# Patient Record
Sex: FEMALE | Race: WHITE | NOT HISPANIC OR LATINO | Employment: FULL TIME
[De-identification: names, ages, dates, MRNs, and addresses within clinical notes are randomized per-mention and may not be internally consistent; named-entity substitution may affect disease eponyms.]

---

## 2017-06-03 ENCOUNTER — HEALTH MAINTENANCE LETTER (OUTPATIENT)
Age: 52
End: 2017-06-03

## 2020-02-17 ENCOUNTER — TRANSFERRED RECORDS (OUTPATIENT)
Dept: HEALTH INFORMATION MANAGEMENT | Facility: CLINIC | Age: 55
End: 2020-02-17

## 2020-02-17 LAB
ALT SERPL-CCNC: 31 IU/L (ref 8–45)
AST SERPL-CCNC: 36 IU/L (ref 2–40)
CHOLEST SERPL-MCNC: 176 MG/DL (ref 100–199)
CREAT SERPL-MCNC: 0.8 MG/DL (ref 0.57–1.11)
GFR SERPL CREATININE-BSD FRML MDRD: >60 ML/MIN/1.73M2
GLUCOSE SERPL-MCNC: 86 MG/DL (ref 65–100)
HDLC SERPL-MCNC: 54 MG/DL
LDLC SERPL CALC-MCNC: 106 MG/DL
NONHDLC SERPL-MCNC: 122 MG/DL
PAP-ABSTRACT: NORMAL
POTASSIUM SERPL-SCNC: 4.7 MMOL/L (ref 3.5–5)
TRIGL SERPL-MCNC: 78 MG/DL

## 2021-02-22 ENCOUNTER — OFFICE VISIT (OUTPATIENT)
Dept: FAMILY MEDICINE | Facility: CLINIC | Age: 56
End: 2021-02-22
Payer: COMMERCIAL

## 2021-02-22 VITALS
HEART RATE: 81 BPM | OXYGEN SATURATION: 98 % | TEMPERATURE: 97.1 F | BODY MASS INDEX: 22.53 KG/M2 | SYSTOLIC BLOOD PRESSURE: 98 MMHG | HEIGHT: 64 IN | DIASTOLIC BLOOD PRESSURE: 68 MMHG | WEIGHT: 132 LBS

## 2021-02-22 DIAGNOSIS — Z00.00 ROUTINE HISTORY AND PHYSICAL EXAMINATION OF ADULT: Primary | ICD-10-CM

## 2021-02-22 DIAGNOSIS — Z12.31 BREAST CANCER SCREENING BY MAMMOGRAM: ICD-10-CM

## 2021-02-22 DIAGNOSIS — M25.512 LEFT SHOULDER PAIN, UNSPECIFIED CHRONICITY: ICD-10-CM

## 2021-02-22 DIAGNOSIS — E78.5 HYPERLIPIDEMIA LDL GOAL <130: ICD-10-CM

## 2021-02-22 DIAGNOSIS — G43.009 MIGRAINE WITHOUT AURA AND WITHOUT STATUS MIGRAINOSUS, NOT INTRACTABLE: ICD-10-CM

## 2021-02-22 LAB
ALBUMIN SERPL-MCNC: 4 G/DL (ref 3.4–5)
ALP SERPL-CCNC: 98 U/L (ref 40–150)
ALT SERPL W P-5'-P-CCNC: 27 U/L (ref 0–50)
ANION GAP SERPL CALCULATED.3IONS-SCNC: 8 MMOL/L (ref 3–14)
AST SERPL W P-5'-P-CCNC: 27 U/L (ref 0–45)
BILIRUB SERPL-MCNC: 0.6 MG/DL (ref 0.2–1.3)
BUN SERPL-MCNC: 16 MG/DL (ref 7–30)
CALCIUM SERPL-MCNC: 9.6 MG/DL (ref 8.5–10.1)
CHLORIDE SERPL-SCNC: 108 MMOL/L (ref 94–109)
CHOLEST SERPL-MCNC: 181 MG/DL
CO2 SERPL-SCNC: 25 MMOL/L (ref 20–32)
CREAT SERPL-MCNC: 0.84 MG/DL (ref 0.52–1.04)
GFR SERPL CREATININE-BSD FRML MDRD: 77 ML/MIN/{1.73_M2}
GLUCOSE SERPL-MCNC: 77 MG/DL (ref 70–99)
HDLC SERPL-MCNC: 62 MG/DL
LDLC SERPL CALC-MCNC: 108 MG/DL
NONHDLC SERPL-MCNC: 119 MG/DL
POTASSIUM SERPL-SCNC: 3.8 MMOL/L (ref 3.4–5.3)
PROT SERPL-MCNC: 7.3 G/DL (ref 6.8–8.8)
SODIUM SERPL-SCNC: 141 MMOL/L (ref 133–144)
TRIGL SERPL-MCNC: 57 MG/DL

## 2021-02-22 PROCEDURE — 80053 COMPREHEN METABOLIC PANEL: CPT | Performed by: FAMILY MEDICINE

## 2021-02-22 PROCEDURE — 99386 PREV VISIT NEW AGE 40-64: CPT | Performed by: FAMILY MEDICINE

## 2021-02-22 PROCEDURE — 36415 COLL VENOUS BLD VENIPUNCTURE: CPT | Performed by: FAMILY MEDICINE

## 2021-02-22 PROCEDURE — 86803 HEPATITIS C AB TEST: CPT | Performed by: FAMILY MEDICINE

## 2021-02-22 PROCEDURE — 87389 HIV-1 AG W/HIV-1&-2 AB AG IA: CPT | Performed by: FAMILY MEDICINE

## 2021-02-22 PROCEDURE — 80061 LIPID PANEL: CPT | Performed by: FAMILY MEDICINE

## 2021-02-22 PROCEDURE — 99214 OFFICE O/P EST MOD 30 MIN: CPT | Mod: 25 | Performed by: FAMILY MEDICINE

## 2021-02-22 RX ORDER — TRIAMCINOLONE ACETONIDE 0.25 MG/G
OINTMENT TOPICAL 2 TIMES DAILY
COMMUNITY
End: 2022-02-28

## 2021-02-22 RX ORDER — RIZATRIPTAN BENZOATE 10 MG/1
10 TABLET ORAL
COMMUNITY
Start: 2020-02-17 | End: 2021-02-22

## 2021-02-22 RX ORDER — ATORVASTATIN CALCIUM 10 MG/1
10 TABLET, FILM COATED ORAL
COMMUNITY
Start: 2020-03-20 | End: 2021-02-22

## 2021-02-22 RX ORDER — ATORVASTATIN CALCIUM 10 MG/1
10 TABLET, FILM COATED ORAL DAILY
Qty: 90 TABLET | Refills: 3 | Status: SHIPPED | OUTPATIENT
Start: 2021-02-22 | End: 2022-04-04

## 2021-02-22 RX ORDER — RIZATRIPTAN BENZOATE 10 MG/1
10 TABLET ORAL
Qty: 9 TABLET | Refills: 1 | Status: SHIPPED | OUTPATIENT
Start: 2021-02-22 | End: 2022-01-21

## 2021-02-22 ASSESSMENT — MIFFLIN-ST. JEOR: SCORE: 1168.75

## 2021-02-22 NOTE — PATIENT INSTRUCTIONS
Patient Education     Prevention Guidelines, Women Ages 50 to 64  Screening tests and vaccines are an important part of managing your health. A screening test is done to find possible disorders or diseases in people who don't have any symptoms. The goal is to find a disease early so lifestyle changes can be made and you can be watched more closely to reduce the risk of disease, or to detect it early enough to treat it most effectively. Screening tests are not considered diagnostic, but are used to determine if more testing is needed. Health counseling is essential, too. Below are guidelines for these, for women ages 50 to 64. Keep in mind that screening advice varies among expert groups. Talk with your healthcare provider about which tests are best for you and to make sure you re up to date on what you need.  Screening Who needs it How often   Type 2 diabetes or prediabetes All women beginning at age 45 and women without symptoms at any age who are overweight or obese and have 1 or more additional risk factors for diabetes. At  least every 3 years   Type 2 diabetes or prediabetes All women diagnosed with gestational diabetes Lifelong testing at least every 3 years   Type 2 diabetes All women with prediabetes Every year   Unhealthy alcohol use All women in this age group At routine exams   Blood pressure All women in this age group Yearly checkup if your blood pressure is normal  Normal blood pressure is less than 120/80 mm Hg  If your blood pressure reading is higher than normal, follow the advice of your healthcare provider   Breast cancer All women at average risk in this age group Yearly mammogram should be done until age 54. At age 55, you can switch to every other year or choose to continue yearly.  All women should know how their breasts normally look and feel and know the possible benefits and risks of breast cancer screening with mammograms.   Cervical cancer All women in this age group, except women who  have had a complete hysterectomy Pap test every 3 years or Pap test with human papillomavirus (HPV) test every 5 years   Chlamydia Women who are sexually active and at increased risk for infection At yearly routine exams   Colorectal cancer All women at average risk in this age group Multiple tests are available and are used at different times. Possible tests include:    Flexible sigmoidoscopy every 5 years, or    Colonoscopy every 10 years, or    CT colonography (virtual colonoscopy) every 5 years, or    Yearly fecal occult blood test, or    Yearly fecal immunochemical test every year, or    Stool DNA test, every 3 years  If you choose a test other than a colonoscopy and have an abnormal test result, you will need to follow up with a colonoscopy. Screening advice varies among expert groups. Talk with your healthcare provider about which tests are best for you.  Some people should be screened using a different schedule because of their personal or family health history. Talk with your healthcare provider about your health history.   Depression All women in this age group At routine exams   Gonorrhea Sexually active women at increased risk for infection At yearly routine exams   Hepatitis C Anyone at increased risk; 1 time for those born between 1945 and 1965 At routine exams   High cholesterol or triglycerides All women in this age group who are at risk for coronary artery disease At least every 5 years; talk with your healthcare provider about your risk   HIV All women At least once during your lifetime; yearly if at high risk   Lung cancer Women between the ages of 55 to 74 who are in fairly good health and are at higher risk for lung cancer       Currently smoke or have  quit within past 15 years       30-pack-year smoking history  , Eligibility criteria and age limit (possibly up to age 80) may vary across major organizations Yearly lung cancer screening with a low-dose CT scan (LDCT) Talk with your healthcare  provider for more information.   Obesity All women in this age group At yearly routine exams   Osteoporosis Women who are postmenopausal Talk with your healthcare provider   Syphilis Women at increased risk for infection At routine exams; talk with your healthcare provider   Tuberculosis Women at increased risk for infection Talk with your healthcare provider   Vision All women in this age group Talk with your healthcare provider   Vaccine Who needs it How often   Chickenpox (varicella) All women in this age group who have no record of this infection or vaccine 2 doses; the second dose should be given at least 4 weeks after the first dose   Hepatitis A Women at increased risk for infection 2 or 3 doses (depending on the vaccine) given at least 6 months apart; talk with your healthcare provider   Hepatitis B Women at increased risk for infection 2 or 3 doses (depending on the vaccine) ; second dose should be given 1 month after the first dose; if a third dose, it should be given at least 2 months after the second dose and at least 4 months after the first dose; talk with your healthcare provider   Haemophilus influenzae Type B (HIB) Women at increased risk for infection 1 or 3 doses; talk with your healthcare provider   Influenza (flu) All women in this age group Once a year   Measles, mumps, rubella (MMR) Women in this age group born in 1957 or later who have no record of these infections or vaccines 1 or 2doses   Meningococcal Women at increased risk for infection 1 or more doses; talk with your healthcare provider   Pneumococcal conjugate vaccine (PCV13) and pneumococcal polysaccharide vaccine (PPSV23) Women at increased risk for infection PCV13: 1 dose ages 19 to 64 (protects against 13 types of pneumococcal bacteria)  PPSV23: 1 or 2 doses through age 64(protects against 23 types of pneumococcal bacteria)  Talk with your healthcare provider   Tetanus/diphtheria/pertussis (Td/Tdap) booster All women in this age  group Td every 10 years, or a 1-time dose of Tdap instead of a Td booster after age 18, then Td every 10 years   Zoster (Shingles) All women ages 50 and older 2 vaccines are available:       Recombinant zoster vaccine (RZV; Shingrix) is recommended as the preferred shingles vaccine. It's given in a series of 2 doses The 2nd dose is given 2 to 6 months after the first. This is given even if you've had shingles before or or had a previous Zoster live vaccine (ZVL; Zostavax).    Zoster live vaccine (ZVL; Zostavax) may be given to healthy adults over age 60. It's given in one dose      Counseling Who needs it How often   BRCA gene mutation testing for breast and ovarian cancer susceptibility Women with increased risk for having gene mutation When your risk is known; talk with your healthcare provider   Breast cancer and chemoprevention Women at high risk for breast cancer When your risk is known; talk with your healthcare provider   Diet and exercise Women who are overweight or obese When diagnosed, and then at routine exams   Sexually transmitted infection prevention Women at increased risk for infection At routine exams; talk with your healthcare provider   Use of daily aspirin Women ages 50 and up who are at high risk for cardiovascular health problems and not at increased risk for bleeding as identified by their healthcare provider When your risk is known; talk with your healthcare provider   Use of tobacco and the health effects it can cause All women in this age group Every exam   Yamile last reviewed this educational content on 6/1/2020 2000-2020 [employer]. [address] [place] [address], [place] [address]. All rights reserved. This information is not intended as a substitute for professional medical care. Always follow your healthcare professional's instructions.

## 2021-02-22 NOTE — PROGRESS NOTES
SUBJECTIVE:   CC: Ana Cristina Ruth is an 55 year old woman who presents for preventive health visit.         Healthy Habits:    Getting at least 3 servings of Calcium per day:  Yes    Bi-annual eye exam:  Yes    Dental care twice a year:  Yes    Sleep apnea or symptoms of sleep apnea:  None    Diet:  Regular (no restrictions)    Frequency of exercise:  6-7 days/week    Duration of exercise:  30-45 minutes    Taking medications regularly:  Yes    Barriers to taking medications:  None    Medication side effects:  None    PHQ-2 Total Score:    Additional concerns today:  Yes      PROBLEMS TO ADD ON...  Hereto establish care, coming back to [facility] after so many  years   Due for fasting labs and also need refill   Musculoskeletal problem/pain      Duration: left on going since last year     Description   Location: left shoulder front mostly but shoulder always feels tight , no radiating pain, numbness  down the arm.  no neck pain. No recall of injury.  She thought it may have started after she was swimming a lot last year but not any more . She also  does lot of computer work. She had PT over a year ago and it felt a bit better.  she has had may be minor problem on and off since but last couple of months it has started  bothering her more. Has been shoveling snow, does yoga etc and it can aggravate pain. Not taking pain med's as much but concerned with ongoing issue    Intensity:  mild, moderate    Accompanying signs and symptoms:  Denies any  radiation of pain to , numbness, tingling, warmth and swelling    History  Previous similar problem: YES last year , but was better after PT   Previous evaluation:  Had PT     Precipitating or alleviating factors:  Trauma or overuse: no trauma but over use YES, as above   Aggravating factors include: ROM aggravates pain    Therapies tried and outcome: mostly  nothing. Occasional OTC pain med's as needed     Hyperlipidemia Follow-Up      Are you regularly taking any  medication or supplement to lower your cholesterol?   Yes- lipitor     Are you having muscle aches or other side effects that you think could be caused by your cholesterol lowering medication?  No    Migraine     Since your last clinic visit, how have your headaches changed?  No change    How often are you getting headaches or migraines? May be once month , not as frequent now      Are you able to do normal daily activities when you have a migraine? Yes    Are you taking rescue/relief medications? (Select all that apply) Excedrin sometimes takes care of it at early , and if it bad  Maxalt mostly works     How helpful is your rescue/relief medication?  I get total relief    Are you taking any medications to prevent migraines? (Select all that apply)  No    In the past 4 weeks, how often have you gone to urgent care or the emergency room because of your headaches?  0      Today's PHQ-2 Score:   PHQ-2 ( 1999 Pfizer) 2/22/2021   Q1: Little interest or pleasure in doing things 0   Q2: Feeling down, depressed or hopeless 0   PHQ-2 Score 0       Abuse: Current or Past (Physical, Sexual or Emotional) - No  Do you feel safe in your environment? Yes    Have you ever done Advance Care Planning? (For example, a Health Directive, POLST, or a discussion with a medical provider or your loved ones about your wishes): Yes, advance care planning is on file.    Social History     Tobacco Use     Smoking status: Never Smoker   Substance Use Topics     Alcohol use: Yes     Comment: occasionally         No flowsheet data found.    Any new diagnosis of family breast, ovarian, or bowel cancer?no     Reviewed orders with patient.  Reviewed health maintenance and updated orders accordingly - Yes  Patient Active Problem List   Diagnosis     Other type of intractable migraine     Past Surgical History:   Procedure Laterality Date     C/SECTION, LOW TRANSVERSE  1999     COLONOSCOPY  11/8/2013    Procedure: COLONOSCOPY;  COLONOSCOPY ;  Surgeon:  Geeta Stevens MD;  Location:  GI       Social History     Tobacco Use     Smoking status: Never Smoker     Smokeless tobacco: Never Used   Substance Use Topics     Alcohol use: Yes     Comment: occasionally     Family History   Problem Relation Age of Onset     Thyroid Disease Father      Cerebrovascular Disease Father         TIA , at age 81      Lipids Mother      Diabetes No family hx of      Coronary Artery Disease No family hx of      Breast Cancer No family hx of      Colon Cancer No family hx of            Breast CA Risk Screening:  No flowsheet data found.      Mammogram Screening: Recommended mammography every 1-2 years with patient discussion and risk factor consideration  Pertinent mammograms are reviewed under the imaging tab.    History of abnormal Pap smear: NO - age 30- 65 PAP every 3 years recommended  PAP / HPV 6/8/2007 12/23/2005   PAP NIL NIL     Reviewed and updated as needed this visit by clinical staff                 Reviewed and updated as needed this visit by Provider                Past Medical History:   Diagnosis Date     Herpes simplex without mention of complication     treats with Zovirax cream     Irritable bowel syndrome     bloating     Other forms of migraine, with intractable migraine, so stated, without mention of status migrainosus     since age 18     Rash and other nonspecific skin eruption     anus        Review of Systems  CONSTITUTIONAL: NEGATIVE for fever, chills, change in weight  INTEGUMENTARU/SKIN: NEGATIVE for worrisome rashes, moles or lesions  EYES: NEGATIVE for vision changes or irritation  ENT: NEGATIVE for ear, mouth and throat problems  RESP: NEGATIVE for significant cough or SOB  BREAST: NEGATIVE for masses, tenderness or discharge  CV: NEGATIVE for chest pain, palpitations or peripheral edema  GI: NEGATIVE for nausea, abdominal pain, heartburn, or change in bowel habits  : NEGATIVE for unusual urinary or vaginal symptoms. Periods are  regular.  MUSCULOSKELETAL:NEGATIVE for significant arthralgias or myalgia and except as per HPI   NEURO: NEGATIVE for weakness, dizziness or paresthesias  ENDOCRINE: NEGATIVE for temperature intolerance, skin/hair changes  HEME/ALLERGY/IMMUNE: NEGATIVE for bleeding problems  PSYCHIATRIC: NEGATIVE for changes in mood or affect     OBJECTIVE:   There were no vitals taken for this visit.  Physical Exam  GENERAL: healthy, alert and no distress  EYES: Eyes grossly normal to inspection, PERRL and conjunctivae and sclerae normal  HENT: ear canals and TM's normal, nose and mouth without ulcers or lesions  NECK: no adenopathy, no asymmetry, masses, or scars and thyroid normal to palpation  RESP: lungs clear to auscultation - no rales, rhonchi or wheezes  BREAST: normal without masses, tenderness or nipple discharge and no palpable axillary masses or adenopathy  CV: regular rate and rhythm, normal S1 S2, no S3 or S4, no murmur, click or rub, no peripheral edema and peripheral pulses strong  ABDOMEN: soft, nontender, no hepatosplenomegaly, no masses and bowel sounds normal   (female): : deferred  RECTAL: deferred  MS: no gross musculoskeletal defects noted, no edema  SKIN: no suspicious lesions or rashes  NEURO: Normal strength and tone, mentation intact and speech normal  NEURO: Normal strength and tone, sensory exam grossly normal and mentation intact  PSYCH: mentation appears normal, affect normal/bright        ASSESSMENT/PLAN:   (Z00.00) Routine history and physical examination of adult  (primary encounter diagnosis)  Comment:   Plan: HIV Antigen Antibody Combo, Hepatitis C Screen         Reflex to HCV RNA Quant and Genotype            (Z12.31) Breast cancer screening by mammogram  Comment:   Plan: *MA Screening Digital Bilateral            (E78.5) Hyperlipidemia LDL goal <130  Comment:   Plan: Comprehensive metabolic panel, Lipid panel         reflex to direct LDL Fasting, atorvastatin         (LIPITOR) 10 MG tablet      "       (M26.512) Left shoulder pain, unspecified chronicity  Comment:   Plan: [facility] REFERRAL            (G43.009) Migraine without aura and without status migrainosus, not intractable  Comment:   Plan: rizatriptan (MAXALT) 10 MG tablet            COUNSELING:  Reviewed preventive health counseling, as reflected in patient instructions       Regular exercise       Healthy diet/nutrition       Vision screening       Hearing screening       Immunizations    Declined: Zoster due to Cost               Osteoporosis prevention/bone health       Colon cancer screening       Consider Hep C screening for all patients one time for ages 18-79 years       HIV screeninx in teen years, 1x in adult years, and at intervals if high risk    Estimated body mass index is 20.6 kg/m  as calculated from the following:    Height as of 13: 1.626 m (5' 4\").    Weight as of 13: 54.4 kg (120 lb).        She reports that she has never smoked. She does not have any smokeless tobacco history on file.      Counseling Resources:  ATP IV Guidelines  Pooled Cohorts Equation Calculator  Breast Cancer Risk Calculator  BRCA-Related Cancer Risk Assessment: FHS-7 Tool  FRAX Risk Assessment  ICSI Preventive Guidelines  Dietary Guidelines for Americans,   USDA's MyPlate  ASA Prophylaxis  Lung CA Screening    Saige Blum MD  [facility]  "

## 2021-02-23 LAB
HCV AB SERPL QL IA: NONREACTIVE
HIV 1+2 AB+HIV1 P24 AG SERPL QL IA: NONREACTIVE

## 2021-03-03 ENCOUNTER — THERAPY VISIT (OUTPATIENT)
Dept: PHYSICAL THERAPY | Facility: CLINIC | Age: 56
End: 2021-03-03
Attending: FAMILY MEDICINE
Payer: COMMERCIAL

## 2021-03-03 DIAGNOSIS — M25.512 LEFT SHOULDER PAIN, UNSPECIFIED CHRONICITY: ICD-10-CM

## 2021-03-03 PROCEDURE — 97110 THERAPEUTIC EXERCISES: CPT | Mod: GP | Performed by: PHYSICAL THERAPIST

## 2021-03-03 PROCEDURE — 97161 PT EVAL LOW COMPLEX 20 MIN: CPT | Mod: GP | Performed by: PHYSICAL THERAPIST

## 2021-03-03 NOTE — PROGRESS NOTES
"[facility] Initial Evaluation  Subjective:  The history is provided by the patient.   Patient Health History  Ana Cristinabelkys Ruth being seen for Shoulder pain.     Problem began: 1/3/2021.   Problem occurred: Unknown. Perhaps through exercise or housework.   Pain is reported as 3/10 (6/10 at worst) on pain scale.  General health as reported by patient is excellent.  Pertinent medical history includes: migraines/headaches.   Red flags:  None as reported by patient.  Medical allergies: none.   Surgeries include:  Other. Other surgery history details:  (21 years ago).    Current medications:  Anti-inflammatory and other. Other medications details: Ibuprofen for pain (occasionally), Atorvastatin for cholesterol, Maxalt for migraines, Retin-A for skin..    Current occupation is .   Primary job tasks include:  Computer work.                  Therapist Generated HPI Evaluation         Type of problem:  Left shoulder.    This is a recurrent condition.  Condition occurred with:  Unknown cause.  Where condition occurred: at home and during recreation/sport.  Patient reports pain:  Anterior.  Pain is described as aching and is intermittent.  Pain timing: more stiff in AM, worse post-exercise.  Since onset symptoms are unchanged.  Associated symptoms:  Tingling and painful arc (occasional tingling, \"something stretched out\"). Symptoms are exacerbated by using arm at shoulder level and using arm overhead  and relieved by heat.    Previous treatment includes physical therapy. There was significant improvement following previous treatment.  Restrictions due to condition include:  Working in normal job without restrictions.  Barriers include:  None as reported by patient.                        Objective:  Standing Alignment:      Shoulder/UE:  Rounded shoulders                                       Shoulder Evaluation:  ROM:  AROM:  normal                            PROM:  " normal                                Strength:  : pain w/ ABD, flex, IR,  Flexion: Left:5/5    Pain: +    Right: 5/5     Pain:     Abduction:  Left: 5/5   Pain:+    Right: 5/5     Pain:    Internal Rotation:  Left:5/5      Pain:+    Right: 5/5     Pain:  External Rotation:   Left:5/5      Pain:-   Right:5/5     Pain:            Stability Testing:  normal    Left shoulder stability negative testing:  Sulcus sign; Load and shift anterior and Load and shift posterior      Palpation:  Palpation assessed shoulder: tenderness over L biceps tendon.      Mobility Tests:            Scapulothoracic left:  Hypomobile  Scapulothoracic right:  Normal                                       General     ROS    Assessment/Plan:    Patient is a 55 year old female with left side shoulder complaints.    Patient has the following significant findings with corresponding treatment plan.                Diagnosis 1:  L shoulder pain with Scapular Dyskinesis  Pain -  hot/cold therapy, self management, education and home program  Decreased strength - therapeutic exercise and therapeutic activities  Decreased function - therapeutic activities  Impaired posture - neuro re-education, therapeutic activities and home program    Previous and current functional limitations:  (See Goal Flow Sheet for this information)    Short term and Long term goals: (See Goal Flow Sheet for this information)     Communication ability:  Patient appears to be able to clearly communicate and understand verbal and written communication and follow directions correctly.  Treatment Explanation - The following has been discussed with the patient:   RX ordered/plan of care  Anticipated outcomes  Possible risks and side effects  This patient would benefit from PT intervention to resume normal activities.   Rehab potential is excellent.    Frequency:  1 X week, once daily  Duration:  for 6 weeks  Discharge Plan:  Achieve all LTG.  Independent in home treatment  program.  Reach maximal therapeutic benefit.    Please refer to the daily flowsheet for treatment today, total treatment time and time spent performing 1:1 timed codes.

## 2021-03-09 DIAGNOSIS — Z12.31 BREAST CANCER SCREENING BY MAMMOGRAM: ICD-10-CM

## 2021-03-09 PROCEDURE — 77067 SCR MAMMO BI INCL CAD: CPT | Mod: TC | Performed by: RADIOLOGY

## 2021-03-09 PROCEDURE — 77063 BREAST TOMOSYNTHESIS BI: CPT | Mod: TC | Performed by: RADIOLOGY

## 2021-03-10 ENCOUNTER — THERAPY VISIT (OUTPATIENT)
Dept: PHYSICAL THERAPY | Facility: CLINIC | Age: 56
End: 2021-03-10
Payer: COMMERCIAL

## 2021-03-10 DIAGNOSIS — M25.512 LEFT SHOULDER PAIN, UNSPECIFIED CHRONICITY: ICD-10-CM

## 2021-03-10 PROCEDURE — 97112 NEUROMUSCULAR REEDUCATION: CPT | Mod: GP | Performed by: PHYSICAL THERAPIST

## 2021-03-10 PROCEDURE — 97110 THERAPEUTIC EXERCISES: CPT | Mod: GP | Performed by: PHYSICAL THERAPIST

## 2021-03-24 ENCOUNTER — THERAPY VISIT (OUTPATIENT)
Dept: PHYSICAL THERAPY | Facility: CLINIC | Age: 56
End: 2021-03-24
Payer: COMMERCIAL

## 2021-03-24 DIAGNOSIS — M25.512 LEFT SHOULDER PAIN, UNSPECIFIED CHRONICITY: ICD-10-CM

## 2021-03-24 PROCEDURE — 97112 NEUROMUSCULAR REEDUCATION: CPT | Mod: GP | Performed by: PHYSICAL THERAPIST

## 2021-03-24 PROCEDURE — 97110 THERAPEUTIC EXERCISES: CPT | Mod: GP | Performed by: PHYSICAL THERAPIST

## 2021-03-25 ENCOUNTER — IMMUNIZATION (OUTPATIENT)
Dept: NURSING | Facility: CLINIC | Age: 56
End: 2021-03-25
Payer: COMMERCIAL

## 2021-03-25 PROCEDURE — 91300 PR COVID VAC PFIZER DIL RECON 30 MCG/0.3 ML IM: CPT

## 2021-03-25 PROCEDURE — 0001A PR COVID VAC PFIZER DIL RECON 30 MCG/0.3 ML IM: CPT

## 2021-04-14 ENCOUNTER — THERAPY VISIT (OUTPATIENT)
Dept: PHYSICAL THERAPY | Facility: CLINIC | Age: 56
End: 2021-04-14
Payer: COMMERCIAL

## 2021-04-14 DIAGNOSIS — M25.512 LEFT SHOULDER PAIN, UNSPECIFIED CHRONICITY: ICD-10-CM

## 2021-04-14 PROCEDURE — 97110 THERAPEUTIC EXERCISES: CPT | Mod: GP | Performed by: PHYSICAL THERAPIST

## 2021-04-14 PROCEDURE — 97112 NEUROMUSCULAR REEDUCATION: CPT | Mod: GP | Performed by: PHYSICAL THERAPIST

## 2021-04-14 NOTE — PROGRESS NOTES
Subjective:  HPI  Physical Exam                    Objective:  System    Physical Exam    General     ROS    Assessment/Plan:    DISCHARGE REPORT    Progress reporting period is from initial to 4/14/2021.       SUBJECTIVE  Subjective changes noted by patient:  Ana Cristina returns to PT feeling little better overall, HEP no issues. She does have some L shoulder ache with moving arm out to the side and she is fearful of how her arm would feel with starting to swim.  She swam more last year but thinking this caused some of her L shoulder sx's.  Overall having L anterior shoulder ache with certain movements, this is less but not 100% better.  Current pain level is 2/10.    Previous pain level was  NA Initial Pain level: 6/10.   Changes in function:  Yes (See Goal flowsheet attached for changes in current functional level)  Adverse reaction to treatment or activity: None    OBJECTIVE  Changes noted in objective findings:  Yes, shoulder AROM WNL, strength progressing but continued limits with ER and Horz Abd.  Shoulder AROM Flex WNL, ABD WNL w pain L, ER WNL..  PROM WNL ER 90 deg, IR 75 no pain.  MMT ER 5-/5, IR 5/5, Flex/Scap 4+/5.  Pain with Obriens (less pain step 2 so  + for biceps LHB involvement)     ASSESSMENT/PLAN  Updated problem list and treatment plan: Diagnosis 1:  L shoulder pain, LHB pathology probable  Pain -  hot/cold therapy, education and home program  Decreased strength - therapeutic exercise and therapeutic activities  Decreased function - therapeutic activities  Impaired posture - neuro re-education  STG/LTGs have been met or progress has been made towards goals:  Yes (See Goal flow sheet completed today.)  Assessment of Progress: The patient's condition is improving.  Self Management Plans:  Patient has been instructed in a home treatment program.  Patient is independent in a home treatment program.  Patient  has been instructed in self management of symptoms.  Patient is independent in self management of  symptoms.  I have re-evaluated this patient and find that the nature, scope, duration and intensity of the therapy is appropriate for the medical condition of the patient.  Ana Cristina continues to require the following intervention to meet STG and LTG's:  PT intervention is no longer required to meet STG/LTG.    Recommendations:  This patient is ready to be discharged from therapy and continue their home treatment program.  Pt will reach out to PT with any concerns/questions over the next 6 weeks.  Gave Dr Aragon name as possible shoulder Ortho MD for her to see in future if she needs.     Please refer to the daily flowsheet for treatment today, total treatment time and time spent performing 1:1 timed codes.

## 2021-04-15 ENCOUNTER — IMMUNIZATION (OUTPATIENT)
Dept: NURSING | Facility: CLINIC | Age: 56
End: 2021-04-15
Attending: INTERNAL MEDICINE
Payer: COMMERCIAL

## 2021-04-15 PROCEDURE — 91300 PR COVID VAC PFIZER DIL RECON 30 MCG/0.3 ML IM: CPT

## 2021-04-15 PROCEDURE — 0002A PR COVID VAC PFIZER DIL RECON 30 MCG/0.3 ML IM: CPT

## 2021-10-24 ENCOUNTER — HEALTH MAINTENANCE LETTER (OUTPATIENT)
Age: 56
End: 2021-10-24

## 2022-01-21 DIAGNOSIS — G43.009 MIGRAINE WITHOUT AURA AND WITHOUT STATUS MIGRAINOSUS, NOT INTRACTABLE: ICD-10-CM

## 2022-01-21 RX ORDER — RIZATRIPTAN BENZOATE 10 MG/1
TABLET ORAL
Qty: 9 TABLET | Refills: 0 | Status: SHIPPED | OUTPATIENT
Start: 2022-01-21 | End: 2022-04-04

## 2022-01-21 NOTE — TELEPHONE ENCOUNTER
Prescription approved per [facility] Refill Protocol.    Rizwana WADE RN  EP Triage

## 2022-02-28 ENCOUNTER — OFFICE VISIT (OUTPATIENT)
Dept: FAMILY MEDICINE | Facility: CLINIC | Age: 57
End: 2022-02-28
Payer: COMMERCIAL

## 2022-02-28 VITALS
RESPIRATION RATE: 12 BRPM | HEIGHT: 64 IN | OXYGEN SATURATION: 98 % | SYSTOLIC BLOOD PRESSURE: 108 MMHG | TEMPERATURE: 97.8 F | HEART RATE: 69 BPM | WEIGHT: 137 LBS | DIASTOLIC BLOOD PRESSURE: 62 MMHG | BODY MASS INDEX: 23.39 KG/M2

## 2022-02-28 DIAGNOSIS — M79.18 GLUTEAL PAIN: Primary | ICD-10-CM

## 2022-02-28 DIAGNOSIS — K59.09 OTHER CONSTIPATION: ICD-10-CM

## 2022-02-28 PROCEDURE — 99214 OFFICE O/P EST MOD 30 MIN: CPT | Performed by: FAMILY MEDICINE

## 2022-02-28 RX ORDER — VITAMIN B COMPLEX
TABLET ORAL DAILY
COMMUNITY

## 2022-02-28 RX ORDER — POLYETHYLENE GLYCOL 3350 17 G/17G
1 POWDER, FOR SOLUTION ORAL DAILY
Qty: 507 G | Refills: 3 | Status: SHIPPED | OUTPATIENT
Start: 2022-02-28 | End: 2023-05-10

## 2022-02-28 RX ORDER — NAPROXEN 500 MG/1
500 TABLET ORAL 2 TIMES DAILY WITH MEALS
Qty: 20 TABLET | Refills: 0 | Status: SHIPPED | OUTPATIENT
Start: 2022-02-28 | End: 2022-05-05

## 2022-02-28 NOTE — PROGRESS NOTES
Assessment & Plan     (M79.18) Gluteal pain  (primary encounter diagnosis)  Comment: left / back of thigh mostly -sounds like gluteal strain , doubt sciatica   Plan: naproxen (NAPROSYN) 500 MG tablet             discussed  cares and symptomatic treatment including  adequate pain control, heat,  stretches etc. Script faxed         she will do follow up if no improvement or problem. Consider further evaluation  if needed.       (K59.09) Other constipation  Comment:   Plan: polyethylene glycol (MIRALAX) 17 GM/Dose powder     talked about High fibre / fluid diet helps to regulate  bowl movement .  I have sent a prescription of miralax  to help with constipation to use as needed. Also talked about probiotic/ squatty potty , regular walking etc to help   .  Cares and  symptomatic treatment discussed.  make sure to do  follow up if  recurrent or ongoing problem. We should  consider further evaluation if needed.     Check labs. refill sent.Cares and  treatment discussed follow. up if problem   Patient expressed understanding and agreement with treatment plan. All patient's questions were answered, will let me know if has more later.  Medications: Rx's: Reviewed the potential side effects/complications of medications prescribed.             Saige Blum MD  [facility] De La Paz is a 56 year old who presents for the following health issues     Musculoskeletal Problem    History of Present Illness     Reason for visit:  Pain in bottom that radiates down legs, and questions about excessive gas  Symptom onset:  More than a month  Symptoms include:  Pain that starts in leg and radiates down legs, and excessive intestinal gas  Symptom intensity:  Moderate  Symptom progression:  Staying the same  What makes it worse:  For legs, sitting for extended periods, walking distances, running outside. For gas, varies  What makes it better:  Legs: Opposite of things that make it feel worse.  "Gas: Not eating certain foods    She eats 2-3 servings of fruits and vegetables daily.She consumes 0 sweetened beverage(s) daily.She exercises with enough effort to increase her heart rate 30 to 60 minutes per day.  She exercises with enough effort to increase her heart rate 7 days per week.   She is taking medications regularly.        Where is your back pain located? (Select all that apply) gluteus left    How would you describe your back pain?  burning and gnawing    Where does your back pain spread?  Mostly localized to  the left buttock/ back of thigh, no radiation further down the leg     Since your last clinic visit for back pain, how has your pain changed? gradually worsening    Does your back pain interfere with your job? 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                          No other associated urinary/ GI sx except often has on and off constipation/ gas feeling,  but no new and no  recent  change , although it does become annoying. Tries to take metamucil to help       have you  any previous problem.     any  treatment? No        Review of Systems   Constitutional, HEENT, cardiovascular, pulmonary, GI, , musculoskeletal, neuro, skin, endocrine and psych systems are negative, except as otherwise noted.      Objective    /62   Pulse 69   Temp 97.8  F (36.6  C) (Tympanic)   Resp 12   Ht 1.626 m (5' 4\")   Wt 62.1 kg (137 lb)   SpO2 98%   BMI 23.52 kg/m    Body mass index is 23.52 kg/m .  Physical Exam   GENERAL: healthy, alert and no distress  NECK: no adenopathy, no asymmetry, masses, or scars and thyroid normal to palpation  RESP: lungs clear to auscultation - no rales, rhonchi or wheezes  CV: regular rate and rhythm, normal S1 S2, no S3 or S4, no murmur,  ABDOMEN: soft, nontender, no " hepatosplenomegaly, no masses and bowel sounds normal  MS: minimal tenderness left gluteal area mostly and hip ROM is ok but aggravates pain mostly on left . No back / spinal  tenderness to palpation      Back ROM is good  NEURO: Normal strength and tone, mentation intact and speech normal  PSYCH: mentation appears normal, affect normal/bright

## 2022-03-28 ASSESSMENT — ENCOUNTER SYMPTOMS
NAUSEA: 0
JOINT SWELLING: 0
DIARRHEA: 0
PALPITATIONS: 0
CHILLS: 0
FEVER: 0
SORE THROAT: 0
SHORTNESS OF BREATH: 0
ARTHRALGIAS: 0
FREQUENCY: 0
DYSURIA: 0
MYALGIAS: 1
HEADACHES: 1
CONSTIPATION: 1
COUGH: 0
EYE PAIN: 0
ABDOMINAL PAIN: 0
HEMATURIA: 0
NERVOUS/ANXIOUS: 0
BREAST MASS: 0
HEMATOCHEZIA: 0
PARESTHESIAS: 0
WEAKNESS: 0
DIZZINESS: 0
HEARTBURN: 0

## 2022-04-04 ENCOUNTER — OFFICE VISIT (OUTPATIENT)
Dept: FAMILY MEDICINE | Facility: CLINIC | Age: 57
End: 2022-04-04
Payer: COMMERCIAL

## 2022-04-04 VITALS
DIASTOLIC BLOOD PRESSURE: 80 MMHG | HEIGHT: 64 IN | BODY MASS INDEX: 22.71 KG/M2 | TEMPERATURE: 97.6 F | OXYGEN SATURATION: 100 % | WEIGHT: 133 LBS | RESPIRATION RATE: 16 BRPM | SYSTOLIC BLOOD PRESSURE: 120 MMHG | HEART RATE: 55 BPM

## 2022-04-04 DIAGNOSIS — Z12.31 BREAST CANCER SCREENING BY MAMMOGRAM: ICD-10-CM

## 2022-04-04 DIAGNOSIS — E78.5 HYPERLIPIDEMIA LDL GOAL <130: ICD-10-CM

## 2022-04-04 DIAGNOSIS — K59.09 OTHER CONSTIPATION: ICD-10-CM

## 2022-04-04 DIAGNOSIS — G43.009 MIGRAINE WITHOUT AURA AND WITHOUT STATUS MIGRAINOSUS, NOT INTRACTABLE: ICD-10-CM

## 2022-04-04 DIAGNOSIS — Z83.49 FAMILY HISTORY OF THYROID DISEASE: ICD-10-CM

## 2022-04-04 DIAGNOSIS — Z00.00 ROUTINE GENERAL MEDICAL EXAMINATION AT A HEALTH CARE FACILITY: Primary | ICD-10-CM

## 2022-04-04 PROBLEM — Z86.69: Status: ACTIVE | Noted: 2022-04-04

## 2022-04-04 LAB
ALBUMIN SERPL-MCNC: 3.9 G/DL (ref 3.4–5)
ALP SERPL-CCNC: 92 U/L (ref 40–150)
ALT SERPL W P-5'-P-CCNC: 31 U/L (ref 0–50)
ANION GAP SERPL CALCULATED.3IONS-SCNC: 6 MMOL/L (ref 3–14)
AST SERPL W P-5'-P-CCNC: 27 U/L (ref 0–45)
BILIRUB SERPL-MCNC: 0.6 MG/DL (ref 0.2–1.3)
BUN SERPL-MCNC: 13 MG/DL (ref 7–30)
CALCIUM SERPL-MCNC: 9.5 MG/DL (ref 8.5–10.1)
CHLORIDE BLD-SCNC: 105 MMOL/L (ref 94–109)
CHOLEST SERPL-MCNC: 171 MG/DL
CO2 SERPL-SCNC: 27 MMOL/L (ref 20–32)
CREAT SERPL-MCNC: 0.86 MG/DL (ref 0.52–1.04)
FASTING STATUS PATIENT QL REPORTED: YES
GFR SERPL CREATININE-BSD FRML MDRD: 78 ML/MIN/1.73M2
GLUCOSE BLD-MCNC: 82 MG/DL (ref 70–99)
HDLC SERPL-MCNC: 54 MG/DL
LDLC SERPL CALC-MCNC: 95 MG/DL
NONHDLC SERPL-MCNC: 117 MG/DL
POTASSIUM BLD-SCNC: 4.1 MMOL/L (ref 3.4–5.3)
PROT SERPL-MCNC: 7.4 G/DL (ref 6.8–8.8)
SODIUM SERPL-SCNC: 138 MMOL/L (ref 133–144)
TRIGL SERPL-MCNC: 110 MG/DL
TSH SERPL DL<=0.005 MIU/L-ACNC: 1.7 MU/L (ref 0.4–4)

## 2022-04-04 PROCEDURE — 80061 LIPID PANEL: CPT | Performed by: FAMILY MEDICINE

## 2022-04-04 PROCEDURE — 36415 COLL VENOUS BLD VENIPUNCTURE: CPT | Performed by: FAMILY MEDICINE

## 2022-04-04 PROCEDURE — 80053 COMPREHEN METABOLIC PANEL: CPT | Performed by: FAMILY MEDICINE

## 2022-04-04 PROCEDURE — 99213 OFFICE O/P EST LOW 20 MIN: CPT | Mod: 25 | Performed by: FAMILY MEDICINE

## 2022-04-04 PROCEDURE — 84443 ASSAY THYROID STIM HORMONE: CPT | Performed by: FAMILY MEDICINE

## 2022-04-04 PROCEDURE — 99396 PREV VISIT EST AGE 40-64: CPT | Performed by: FAMILY MEDICINE

## 2022-04-04 RX ORDER — RIZATRIPTAN BENZOATE 10 MG/1
10 TABLET ORAL
Qty: 9 TABLET | Refills: 1 | Status: SHIPPED | OUTPATIENT
Start: 2022-04-04 | End: 2023-05-10

## 2022-04-04 RX ORDER — ATORVASTATIN CALCIUM 10 MG/1
10 TABLET, FILM COATED ORAL DAILY
Qty: 90 TABLET | Refills: 3 | Status: SHIPPED | OUTPATIENT
Start: 2022-04-04 | End: 2023-05-10

## 2022-04-04 ASSESSMENT — ENCOUNTER SYMPTOMS
HEMATOCHEZIA: 0
FEVER: 0
WEAKNESS: 0
SORE THROAT: 0
HEARTBURN: 0
HEADACHES: 1
ARTHRALGIAS: 0
PALPITATIONS: 0
FREQUENCY: 0
CHILLS: 0
PARESTHESIAS: 0
JOINT SWELLING: 0
EYE PAIN: 0
COUGH: 0
CONSTIPATION: 1
SHORTNESS OF BREATH: 0
NERVOUS/ANXIOUS: 0
DIZZINESS: 0
DYSURIA: 0
BREAST MASS: 0
DIARRHEA: 0
NAUSEA: 0
MYALGIAS: 1
HEMATURIA: 0
ABDOMINAL PAIN: 0

## 2022-04-04 NOTE — PROGRESS NOTES
SUBJECTIVE:   CC: Ana Cristina Ruth is an 56 year old woman who presents for preventive health visit.       Patient has been advised of split billing requirements and indicates understanding: Yes  Healthy Habits:     Getting at least 3 servings of Calcium per day:  NO    Bi-annual eye exam:  Yes    Dental care twice a year:  Yes    Sleep apnea or symptoms of sleep apnea:  None    Diet:  Regular (no restrictions)    Frequency of exercise:  6-7 days/week    Duration of exercise:  45-60 minutes    Taking medications regularly:  Yes    Barriers to taking medications:  None    Medication side effects:  None    PHQ-2 Total Score: 0    Additional concerns today:  Yes          PROBLEMS TO ADD ON...  She has some ongoing issues with the constipation here to do follow-up.  She has started taking some MiraLAX that helped some but not enough.  Although recently she just started using a squatty potty and she thinks it has helped a bit.    Still a bit gassy and bloated feeling sometimes, although she is trying to make some changes in her diet and she thinks that she could be sensitive to dairy. willing to give a little bit more time.    She has noted a bit of a heat on tolerance, not sure if  could be just the menopause related.  Denies any fevers or chills or any unusual fatigue.  no significant weight loss or weight gain,  but she feel like she has gained some inches around her waist.  She does exercise quite regularly and does a good workout usually.  has family history of thyroid in that, willing to proceed with thyroid lab test today just to make sure it is okay.     Hyperlipidemia Follow-Up      Are you regularly taking any medication or supplement to lower your cholesterol?   Yes- see epic She is fasting for labs today and could use a refill on her medication.     Are you having muscle aches or other side effects that you think could be caused by your cholesterol lowering medication?  No    Migraine     Since your  last clinic visit, how have your headaches changed?  No change, doing quite well.  She sometimes gets attentive in her shoulder and neck, and that can sometimes work its way up to give her migraine.  Although no recent change and she thinks she is doing okay.  Could use refill on her Maxalt    How often are you getting headaches or migraines? Once a month      Are you able to do normal daily activities when you have a migraine? Yes    Are you taking rescue/relief medications? (Select all that apply) Maxalt    How helpful is your rescue/relief medication?  I get total relief    Are you taking any medications to prevent migraines? (Select all that apply)  No    In the past 4 weeks, how often have you gone to urgent care or the emergency room because of your headaches?  0      Today's PHQ-2 Score:   PHQ-2 ( 1999 Pfizer) 3/28/2022   Q1: Little interest or pleasure in doing things 0   Q2: Feeling down, depressed or hopeless 0   PHQ-2 Score 0   PHQ-2 Total Score (12-17 Years)- Positive if 3 or more points; Administer PHQ-A if positive -   Q1: Little interest or pleasure in doing things Not at all   Q2: Feeling down, depressed or hopeless Not at all   PHQ-2 Score 0       Abuse: Current or Past (Physical, Sexual or Emotional) - No  Do you feel safe in your environment? Yes        Social History     Tobacco Use     Smoking status: Never Smoker     Smokeless tobacco: Never Used   Substance Use Topics     Alcohol use: Yes     Comment: occasionally     If you drink alcohol do you typically have >3 drinks per day or >7 drinks per week? No    Alcohol Use 3/28/2022   Prescreen: >3 drinks/day or >7 drinks/week? No       Reviewed orders with patient.  Reviewed health maintenance and updated orders accordingly - Yes  Patient Active Problem List   Diagnosis     Other type of intractable migraine     Migraine without aura and without status migrainosus, not intractable     Hyperlipidemia LDL goal <130     Gluteal pain     Other  constipation     Past Surgical History:   Procedure Laterality Date     C/SECTION, LOW TRANSVERSE  1999     COLONOSCOPY  11/8/2013    Procedure: COLONOSCOPY;  COLONOSCOPY ;  Surgeon: Geeta Stevens MD;  Location:  GI       Social History     Tobacco Use     Smoking status: Never Smoker     Smokeless tobacco: Never Used   Substance Use Topics     Alcohol use: Yes     Comment: occasionally     Family History   Problem Relation Age of Onset     Thyroid Disease Father      Cerebrovascular Disease Father         TIA , at age 81      Lipids Mother      Diabetes No family hx of      Coronary Artery Disease No family hx of      Breast Cancer No family hx of      Colon Cancer No family hx of            Breast Cancer Screening:    FHS-7:   Breast CA Risk Assessment (FHS-7) 3/28/2022   Did any of your first-degree relatives have breast or ovarian cancer? No   Did any of your relatives have bilateral breast cancer? No   Did any man in your family have breast cancer? No   Did any woman in your family have breast and ovarian cancer? No   Did any woman in your family have breast cancer before age 50 y? No   Do you have 2 or more relatives with breast and/or ovarian cancer? Yes   Do you have 2 or more relatives with breast and/or bowel cancer? No       Mammogram Screening: Recommended mammography every 1-2 years with patient discussion and risk factor consideration  Pertinent mammograms are reviewed under the imaging tab.    History of abnormal Pap smear: NO - age 30- 65 PAP every 3 years recommended  PAP / HPV 6/8/2007 12/23/2005   PAP (Historical) NIL NIL     Reviewed and updated as needed this visit by clinical staff                  Reviewed and updated as needed this visit by Provider                 Past Medical History:   Diagnosis Date     Herpes simplex without mention of complication     treats with Zovirax cream     Irritable bowel syndrome     bloating     Other forms of migraine, with intractable migraine, so  stated, without mention of status migrainosus     since age 18     Rash and other nonspecific skin eruption     anus        Review of Systems   Constitutional: Negative for chills and fever.   HENT: Positive for hearing loss. Negative for congestion, ear pain and sore throat.    Eyes: Negative for pain and visual disturbance.   Respiratory: Negative for cough and shortness of breath.    Cardiovascular: Negative for chest pain, palpitations and peripheral edema.   Gastrointestinal: Positive for constipation. Negative for abdominal pain, diarrhea, heartburn, hematochezia and nausea.   Breasts:  Negative for tenderness, breast mass and discharge.   Genitourinary: Negative for dysuria, frequency, genital sores, hematuria, pelvic pain, urgency, vaginal bleeding and vaginal discharge.   Musculoskeletal: Positive for myalgias. Negative for arthralgias and joint swelling.   Skin: Negative for rash.   Neurological: Positive for headaches. Negative for dizziness, weakness and paresthesias.   Psychiatric/Behavioral: Negative for mood changes. The patient is not nervous/anxious.      CONSTITUTIONAL: NEGATIVE for fever, chills, change in weight  INTEGUMENTARY/SKIN: NEGATIVE for worrisome rashes, moles or lesions  EYES: NEGATIVE for vision changes or irritation  ENT: NEGATIVE for ear, mouth and throat problems.  Has history of labyrinthitis and has some hearing issues, few years ago and had seen the ENT.  She was told that she might of it mild decrease hearing, although it has remained stable denies any new concerns comfortable watching.  No new concerning symptoms otherwise  RESP: NEGATIVE for significant cough or SOB  BREAST: NEGATIVE for masses, tenderness or discharge  CV: NEGATIVE for chest pain, palpitations or peripheral edema  GI: NEGATIVE for nausea, abdominal pain, heartburn, or change in bowel habits  : NEGATIVE for unusual urinary or vaginal symptoms.  Postmenopausal . no vaginal bleeding.  MUSCULOSKELETAL: NEGATIVE  for significant arthralgias or myalgia  NEURO: NEGATIVE for weakness, dizziness or paresthesias, except as per HPI  ENDOCRINE: NEGATIVE for temperature intolerance, skin/hair changes  HEME/ALLERGY/IMMUNE: NEGATIVE for bleeding problems  PSYCHIATRIC: NEGATIVE for changes in mood or affect      OBJECTIVE:   There were no vitals taken for this visit.  Physical Exam  GENERAL: healthy, alert and no distress  EYES: Eyes grossly normal to inspection, PERRL and conjunctivae and sclerae normal  HENT: ear canals and TM's normal, nose and mouth without ulcers or lesions  NECK: no adenopathy, no asymmetry, masses, or scars and thyroid normal to palpation  RESP: lungs clear to auscultation - no rales, rhonchi or wheezes  BREAST: normal without masses, tenderness or nipple discharge and no palpable axillary masses or adenopathy  CV: regular rate and rhythm, normal S1 S2, no S3 or S4, no murmur, click or rub, no peripheral edema and peripheral pulses strong  ABDOMEN: soft, nontender, no hepatosplenomegaly, no masses and bowel sounds normal   (female): deferred  RECTAL: deferred  MS: no gross musculoskeletal defects noted, no edema  SKIN: no suspicious lesions or rashes  NEURO: Normal strength and tone, mentation intact and speech normal  PSYCH: mentation appears normal, affect normal/bright        ASSESSMENT/PLAN:   (Z00.00) Routine general medical examination at a health care facility  (primary encounter diagnosis)  Comment:   Plan:     (E78.5) Hyperlipidemia LDL goal <130  Comment:   Plan: Lipid panel reflex to direct LDL Fasting,         Comprehensive metabolic panel, atorvastatin         (LIPITOR) 10 MG tablet        Check labs.  Refill sent.  Follow-up as needed.     (G43.009) Migraine without aura and without status migrainosus, not intractable  Comment:   Plan: rizatriptan (MAXALT) 10 MG tablet             Treatment today as per [facility] orders. Willing to continue with Maxalt as it is working  good. Asked pt to keep  "diary of her migraine.  Follow up if  problem as  needed.       (K59.09) Other constipation  Comment:   Plan: TSH with free T4 reflex           Talked about high fibre / fluid diet to help regulate  bowl movement .  Continue with miralax  to help with constipation to use as needed.   Also talked about probiotic/ squatty potty , regular walking etc to help   Cares and  symptomatic treatment discussed.  Make sure to do  follow up if  recurrent or ongoing problem. We should  consider further evaluation if needed.       (Z12.31) Breast cancer screening by mammogram  Comment:   Plan: *MA Screening Digital Bilateral            (Z83.49) Family history of thyroid disease  Comment:   Plan: TSH with free T4 reflex        Discussed possible symptoms of  Thyroid.  Clinically she is doing okay.  Check labs.  Follow-up as needed            Check labs. refill sent.Cares and  treatment discussed.  follow up if problem   Patient expressed understanding and agreement with treatment plan. All patient's questions were answered, will let me know if has more later.  Medications: Rx's: Reviewed the potential side effects/complications of medications prescribed.       COUNSELING:  Reviewed preventive health counseling, as reflected in patient instructions       Regular exercise       Healthy diet/nutrition       Vision screening       Hearing screening       Immunizations    Vaccination UTD          Alcohol Use       Osteoporosis prevention/bone health       Colorectal Cancer Screening       (Alise)menopause management       Advance Care Planning    Estimated body mass index is 23.52 kg/m  as calculated from the following:    Height as of 2/28/22: 1.626 m (5' 4\").    Weight as of 2/28/22: 62.1 kg (137 lb).        She reports that she has never smoked. She has never used smokeless tobacco.      Counseling Resources:  ATP IV Guidelines  Pooled Cohorts Equation Calculator  Breast Cancer Risk Calculator  BRCA-Related Cancer Risk Assessment: " FHS-7 Tool  FRAX Risk Assessment  ICSI Preventive Guidelines  Dietary Guidelines for Americans, 2010  USDA's MyPlate  ASA Prophylaxis  Lung CA Screening    Saige Blum MD  [facility]

## 2022-04-13 ENCOUNTER — ANCILLARY PROCEDURE (OUTPATIENT)
Dept: MAMMOGRAPHY | Facility: CLINIC | Age: 57
End: 2022-04-13
Payer: COMMERCIAL

## 2022-04-13 DIAGNOSIS — Z12.31 BREAST CANCER SCREENING BY MAMMOGRAM: ICD-10-CM

## 2022-04-13 PROCEDURE — 77063 BREAST TOMOSYNTHESIS BI: CPT | Mod: TC | Performed by: RADIOLOGY

## 2022-04-13 PROCEDURE — 77067 SCR MAMMO BI INCL CAD: CPT | Mod: TC | Performed by: RADIOLOGY

## 2022-05-05 ENCOUNTER — VIRTUAL VISIT (OUTPATIENT)
Dept: FAMILY MEDICINE | Facility: CLINIC | Age: 57
End: 2022-05-05
Payer: COMMERCIAL

## 2022-05-05 DIAGNOSIS — K59.09 OTHER CONSTIPATION: Primary | ICD-10-CM

## 2022-05-05 PROCEDURE — 99213 OFFICE O/P EST LOW 20 MIN: CPT | Mod: 95 | Performed by: FAMILY MEDICINE

## 2022-05-05 NOTE — PROGRESS NOTES
Ana Cristina is a 56 year old who is being evaluated via a billable video visit.      How would you like to obtain your AVS? MyChart  If the video visit is dropped, the invitation should be resent by: Text to cell phone: [phone]  Will anyone else be joining your video visit? No    Video Start Time: 2:56 PM    Assessment & Plan     (K59.09) Other constipation  (primary encounter diagnosis)  Comment:   Plan: Physical Therapy Referral              Discussed possible cause  for her   Symptoms may be pelvic floor dysfunction as I noted she has hx of fibroid noted per u/s in 2017 .     gave referral to PT to help . Talked about warning s/s for which should be seen. Cares and symptomatic treatment discussed . akfollow up if  recurrent or ongoing problem , consider further evaluation if needed. . discussed high fibre / fluid diet to regulate Bm    Cares and  treatment discussed plan  follow up if problem   Patient expressed understanding and agreement with treatment plan. All patient's questions were answered, will let me know if has more later.  Medications: Rx's: Reviewed the potential side effects/complications of medications prescribed.     Saige Blum MD  [facility]    Froylan De La Paz is a 56 year old who presents for the following health issues     History of Present Illness       Reason for visit:  Continued constipation and leg/back pain  Symptom onset:  More than a month  Symptoms include:  Primarily constipation that seems to be getting worse, and I'm leaving for vacation tomorrow. Haven't been able to poop much in the past week.  Symptom intensity:  Moderate  Symptom progression:  Worsening  Had these symptoms before:  Yes  Has tried/received treatment for these symptoms:  Yes  Previous treatment was successful:  No  What makes it worse:  No  What makes it better:  No    She eats 2-3 servings of fruits and vegetables daily.She consumes 0 sweetened beverage(s) daily.She  exercises with enough effort to increase her heart rate 30 to 60 minutes per day.  She exercises with enough effort to increase her heart rate 7 days per week.   She is taking medications regularly.     haa ongoing constipation despite taking mirlax but still has hard time going verga goesonce every few days always small amount each time - then sometimes she gets loose still after a week when she is back sup, finally she took dulcolax today with some results .   No rectal pain bleeding but as lowe abdominal pressure discomfort . No blood or black stool   Leg pain I has improved still has some lower back pain has improved but still uncomfortable          Review of Systems   Constitutional, HEENT, cardiovascular, pulmonary, GI, , musculoskeletal, neuro, skin, endocrine and psych systems are negative, except as otherwise noted.      Objective           Vitals:  No vitals were obtained today due to virtual visit.    Physical Exam   GENERAL: Healthy, alert and no distress  EYES: Eyes grossly normal to inspection.  No discharge or erythema, or obvious scleral/conjunctival abnormalities.  RESP: No audible wheeze, cough, or visible cyanosis.  No visible retractions or increased work of breathing.    SKIN: Visible skin clear. No significant rash, abnormal pigmentation or lesions.  NEURO: Cranial nerves grossly intact.  Mentation and speech appropriate for age.  PSYCH: Mentation appears normal, affect normal/bright, judgement and insight intact, normal speech and appearance well-groomed.            Video-Visit Details    Type of service:  Video Visit    Video End Time:3:17 PM    Originating Location (pt. Location): Home    Distant Location (provider location):  [facility]     Platform used for Video Visit: [employer]

## 2022-06-10 ENCOUNTER — THERAPY VISIT (OUTPATIENT)
Dept: PHYSICAL THERAPY | Facility: CLINIC | Age: 57
End: 2022-06-10
Attending: FAMILY MEDICINE
Payer: COMMERCIAL

## 2022-06-10 DIAGNOSIS — M99.05 PELVIC SOMATIC DYSFUNCTION: ICD-10-CM

## 2022-06-10 DIAGNOSIS — K59.09 OTHER CONSTIPATION: ICD-10-CM

## 2022-06-10 PROBLEM — K59.00 CONSTIPATION: Status: ACTIVE | Noted: 2022-02-28

## 2022-06-10 PROCEDURE — 97161 PT EVAL LOW COMPLEX 20 MIN: CPT | Mod: GP | Performed by: PHYSICAL THERAPIST

## 2022-06-10 PROCEDURE — 97535 SELF CARE MNGMENT TRAINING: CPT | Mod: GP | Performed by: PHYSICAL THERAPIST

## 2022-06-10 PROCEDURE — 97140 MANUAL THERAPY 1/> REGIONS: CPT | Mod: GP | Performed by: PHYSICAL THERAPIST

## 2022-06-10 PROCEDURE — 97530 THERAPEUTIC ACTIVITIES: CPT | Mod: GP | Performed by: PHYSICAL THERAPIST

## 2022-06-10 NOTE — PROGRESS NOTES
"Physical Therapy Initial Evaluation  Subjective:    Patient Health History  Ana Cristinabelkys Ruth being seen for Pelvic floor strengthening as a way to deal with constipation.       Problem occurred: It has developed over time   Pain is reported as 0/10 on pain scale.  General health as reported by patient is excellent.  Pertinent medical history includes: changes in bowel/bladder, menopausal and migraines/headaches.     Medical allergies: none.   Surgeries include:  Other. Other surgery history details:  in .    Current medications:  Other. Other medications details: cholesterol, migraine, \"laxative\", vitamins.    Current occupation is .   Primary job tasks include:  Computer work.                  SUBJECTIVE:  Patient reports onset of symptoms of constipation worsening since the beginning of the year 2022.  Unable to think of what triggers it.  Symptoms include lack of urge to have a BM, hard to have a BM, only able to get a little out occasionally but it can be a lot of work.  Never feels satisfying to have a BM.  Feels bloated a lot.  Pt denies fecal incontinence.  Pt does admit to some mild lower abdominal pain and sometimes does feel things moving.  Pt reports she thinks she may have fissures.  Pt does report when she wipes, she will find occasional bleeding (looks bright red or pink)  Will feel burning later and will find she has some bleeding.  Denies blood in the stools.  Since onset symptoms have been getting better, worse or staying the same? Worse to same.   Pt had a colonoscopy 9 years ago and was normal. ygy Goal is to work on constipation.    Urination:  Do you leak on the way to the bathroom or with a strong urge to void? No    Do you leak with cough,sneeze, jumping, running?sometimes can happen 1 every 1.5 month  Any other activities that cause leaking? stumbling  Do you have triggers that make you feel you can't wait to go to the bathroom? No     Type of pad and number used " per day? 0  When you leak what is the amount? small    How long can you delay the need to urinate? As needed.   How many times do you get up to urinate at night? 0  Can you stop the flow of urine when on the toilet? Yes  Is the volume of urine passed usually: average. (8sec rule=  250ml with average bladder storing  400-600ml)    Do you strain to pass urine? No  Do you have a slow or hesitant urinary stream? No  Do you have difficulty initiating the urine stream? No    How many bladder infections have you had in last 12 months?0    Fluid intake(one glass is 8oz or one cup) 8 glasses of water/day, occasional tea, soda, sports drink    Bowel habits:  Pt reports she tries to eat a fairly good diet.  Ranges from salad, fruit, meat, some dairy and does have a sweet tooth.  Pt will have bread/carbs.  Pt currently taking a multivitamin and vitamin D and calcium.  Taking probiotic and in winter, will take Vitamin C.  Pt has a prescription for miralax to use it as needed.  Reports it does not work for her.  On occasion, she has used dulcolax but tries to not take it.    Frequency of bowel movements?varies, will go 2-3 times a week  Consistancy of stool? soft formed, hard, Stratford Stool Scale 1-2  Do you ignore the urge to defecate? No  Do you strain to pass stool? Yes    Pelvic Pain:  When do you have pelvic pain? Lower abdominal discomfort/bloating when not able to pass stool for several days    Is initial penetration during intercourse painful? Yes - reports started with menopause  Is deeper penetration painful? Yes - reports started with menopause  Do you use lubricant? yes     Given birth? Yes Any complications?  after several hours of pushing , # of vaginal delieveries?0, # of C-sections?1.  Are you sexually active?Yes  Have you ever been worried for your physical safety? No  Any abdominal or pelvic surgeries? Yes -   Are you having any regular exercise?yes  Have you practiced the PF(kegel) exercises  for 4 or more weeks?no    Marinoff Scale:Level 2-3  (Level 3: Abstinence from intercourse because of severe pain. Level 2: Painful intercourse which limites frequency of activity. Level 1: Painful intercourse not severe enough to prevent activity.)                                    Objective:  System         Lumbar/SI Evaluation  ROM:    AROM Lumbar:   Flexion:          Min limited  Ext:                    Mod limited   Side Bend:        Left:     Right:   Rotation:           Left:     Right:   Side Glide:        Left:  Mod limited    Right:  Mod limited          Lumbar Myotomes:  normal                                                    Pelvic Dysfunction Evaluation:        Flexibility:    Tightness present at:Adductors; Iliopsoas; Piriformis and Gluteals    Abdominal Wall:  Abdominal wall pelvic: firm along descending colon.  decreased fascial mobility of abdomen.  min tender of IC valve         Pelvic Clock Exam:  Pelvic clock exam: internal rectal assessment - trigger point at puborectalis and tender B coccygeus muscle.  Ischiocavernosis pain:  -  Bulbocavernosis pain:  -  Transverse Perineal:  -  Levator ANI:  +  Perineal Body:  -      External Assessment:  External assessment pelvic: minimal movement with contract and relax noted on perineum.  Skin Condition:  Atrophic    Bearing Down/Coughing:  Normal  Tissue Symmetry:  Normal    Muscle Contraction/Perineal Mobility:  Slight lift, no urogential triangle descent  Internal Assessment:  Internal assessment pelvic: minimal movement with relaxation vaginally.  Rectal assessment strength 2/5, poor relaxation and descent palpated rectally.  paradoxic coordination present with push/strain.    Contraction/Grade:  Fair squeeze, definite lift (3)                               General     ROS    Assessment/Plan:    Patient is a 56 year old female with pelvic complaints.    Patient has the following significant findings with corresponding treatment plan.                 Diagnosis 1:  constipation  Pain -  manual therapy, self management, education and home program  Decreased ROM/flexibility - manual therapy, therapeutic exercise, therapeutic activity and home program  Decreased strength - therapeutic exercise, therapeutic activities and home program  Impaired muscle performance - biofeedback, neuro re-education and home program  Decreased function - therapeutic activities and home program  Impaired posture - neuro re-education, therapeutic activities and home program    Therapy Evaluation Codes:   1) History comprised of:   Personal factors that impact the plan of care:      Time since onset of symptoms.    Comorbidity factors that impact the plan of care are:      Bowel/bladder changes, Menopausal and Migraines/headaches.     Medications impacting care: None.  2) Examination of Body Systems comprised of:   Body structures and functions that impact the plan of care:      Pelvis.   Activity limitations that impact the plan of care are:      constipation.  3) Clinical presentation characteristics are:   Stable/Uncomplicated.  4) Decision-Making    Low complexity using standardized patient assessment instrument and/or measureable assessment of functional outcome.  Cumulative Therapy Evaluation is: Low complexity.    Previous and current functional limitations:  (See Goal Flow Sheet for this information)    Short term and Long term goals: (See Goal Flow Sheet for this information)     Communication ability:  Patient appears to be able to clearly communicate and understand verbal and written communication and follow directions correctly.  Treatment Explanation - The following has been discussed with the patient:   RX ordered/plan of care  Anticipated outcomes  Possible risks and side effects  This patient would benefit from PT intervention to resume normal activities.   Rehab potential is good.    Frequency:  1 X week, once daily  Duration:  for 6 weeks  Discharge Plan:  Achieve all  LTG.  Independent in home treatment program.  Reach maximal therapeutic benefit.    Please refer to the daily flowsheet for treatment today, total treatment time and time spent performing 1:1 timed codes.

## 2022-06-17 ENCOUNTER — MYC MEDICAL ADVICE (OUTPATIENT)
Dept: FAMILY MEDICINE | Facility: CLINIC | Age: 57
End: 2022-06-17

## 2022-06-17 ENCOUNTER — THERAPY VISIT (OUTPATIENT)
Dept: PHYSICAL THERAPY | Facility: CLINIC | Age: 57
End: 2022-06-17
Payer: COMMERCIAL

## 2022-06-17 DIAGNOSIS — M99.05 PELVIC SOMATIC DYSFUNCTION: Primary | ICD-10-CM

## 2022-06-17 DIAGNOSIS — K59.00 CONSTIPATION: ICD-10-CM

## 2022-06-17 PROCEDURE — 97110 THERAPEUTIC EXERCISES: CPT | Mod: GP | Performed by: PHYSICAL THERAPIST

## 2022-06-17 PROCEDURE — 97140 MANUAL THERAPY 1/> REGIONS: CPT | Mod: GP | Performed by: PHYSICAL THERAPIST

## 2022-06-17 PROCEDURE — 97530 THERAPEUTIC ACTIVITIES: CPT | Mod: 59 | Performed by: PHYSICAL THERAPIST

## 2022-06-17 PROCEDURE — 90912 BFB TRAINING 1ST 15 MIN: CPT | Mod: 59 | Performed by: PHYSICAL THERAPIST

## 2022-06-21 NOTE — TELEPHONE ENCOUNTER
Please see pt's [employer] message requesting orders and advise, thank you!    The physical therapist (Ivring PETERSON) who I'm seeing for the constipation has requested that I get some imaging done. She sent the request around June 10th. Do you agree with this, and can you order this? How do I get this scheduled?     Thank you,     Ana Cristina    Team to call pt back once Dr. Blum advises. Ph: [phone], Ok to leave detailed message.     Sofie Copeland,  [facility]

## 2022-06-24 NOTE — TELEPHONE ENCOUNTER
Pt calling to check whether Dr. Blum has given a response on her imaging request or not. Informed pt that Dr. Blum is not in today (6/24) but will be back on Monday 6/27. Pt states that she is ok waiting.    Sofie Copeland,  [facility]

## 2022-06-28 ENCOUNTER — THERAPY VISIT (OUTPATIENT)
Dept: PHYSICAL THERAPY | Facility: CLINIC | Age: 57
End: 2022-06-28
Payer: COMMERCIAL

## 2022-06-28 DIAGNOSIS — K59.00 CONSTIPATION: ICD-10-CM

## 2022-06-28 DIAGNOSIS — M99.05 PELVIC SOMATIC DYSFUNCTION: Primary | ICD-10-CM

## 2022-06-28 PROCEDURE — 97530 THERAPEUTIC ACTIVITIES: CPT | Mod: GP | Performed by: PHYSICAL THERAPIST

## 2022-06-28 PROCEDURE — 97140 MANUAL THERAPY 1/> REGIONS: CPT | Mod: GP | Performed by: PHYSICAL THERAPIST

## 2022-06-28 PROCEDURE — 97110 THERAPEUTIC EXERCISES: CPT | Mod: GP | Performed by: PHYSICAL THERAPIST

## 2022-06-28 NOTE — TELEPHONE ENCOUNTER
"Pt called to follow up on this     States PT wanted to have her to \"get some imaging done\"     Intestinal imaging?     Getting PT for pelvic/constipation - working through the constipation yet, asked if it's helping, pt said yes and now     States PT sent a message (staff message?) directly to PCP regarding this     Please advise    Makenna ROMERO, Triage RN  [facility]       "

## 2022-06-30 NOTE — TELEPHONE ENCOUNTER
Saige Blum MD  Ec Triage 48 minutes ago (4:45 PM)     BD    See previous response to PT. If she is having issue and no improvement after PT then she should schedule a follow up visit . Thanks

## 2022-06-30 NOTE — TELEPHONE ENCOUNTER
Patient Contact    Attempt # 1    Was call answered?  No.  Left message on voicemail with information to call triage back at [phone], option 2.     On call back: see note below from Dr. Blum. Patient needs follow up appointment.  Guerline Beard RN

## 2022-07-01 NOTE — TELEPHONE ENCOUNTER
Patient given message from Dr. Blum. Patient states that she will wait to see PT again before scheduling appointment with PT to see if therapist is still recommending imaging. Guerline Beard RN

## 2022-07-05 NOTE — TELEPHONE ENCOUNTER
Pt calling to schedule f/u appt with Dr. Blum. Appt made for Tuesday 7/12/22 at 12:10pm (check-in) with Dr. Blum.    Sofie Copeland,  [facility]

## 2022-07-12 ENCOUNTER — THERAPY VISIT (OUTPATIENT)
Dept: PHYSICAL THERAPY | Facility: CLINIC | Age: 57
End: 2022-07-12
Payer: COMMERCIAL

## 2022-07-12 ENCOUNTER — OFFICE VISIT (OUTPATIENT)
Dept: FAMILY MEDICINE | Facility: CLINIC | Age: 57
End: 2022-07-12
Payer: COMMERCIAL

## 2022-07-12 VITALS
TEMPERATURE: 97.6 F | DIASTOLIC BLOOD PRESSURE: 70 MMHG | BODY MASS INDEX: 23 KG/M2 | OXYGEN SATURATION: 99 % | RESPIRATION RATE: 10 BRPM | HEART RATE: 66 BPM | SYSTOLIC BLOOD PRESSURE: 118 MMHG | WEIGHT: 134 LBS

## 2022-07-12 DIAGNOSIS — K62.5 RECTAL BLEEDING: ICD-10-CM

## 2022-07-12 DIAGNOSIS — Z78.0 MENOPAUSE PRESENT: ICD-10-CM

## 2022-07-12 DIAGNOSIS — K59.00 CONSTIPATION: ICD-10-CM

## 2022-07-12 DIAGNOSIS — K59.00 CONSTIPATION, UNSPECIFIED CONSTIPATION TYPE: Primary | ICD-10-CM

## 2022-07-12 DIAGNOSIS — M99.05 PELVIC SOMATIC DYSFUNCTION: ICD-10-CM

## 2022-07-12 DIAGNOSIS — M99.05 PELVIC SOMATIC DYSFUNCTION: Primary | ICD-10-CM

## 2022-07-12 DIAGNOSIS — D25.9 UTERINE LEIOMYOMA, UNSPECIFIED LOCATION: ICD-10-CM

## 2022-07-12 PROCEDURE — 99214 OFFICE O/P EST MOD 30 MIN: CPT | Performed by: FAMILY MEDICINE

## 2022-07-12 PROCEDURE — 97530 THERAPEUTIC ACTIVITIES: CPT | Mod: GP | Performed by: PHYSICAL THERAPIST

## 2022-07-12 PROCEDURE — 97140 MANUAL THERAPY 1/> REGIONS: CPT | Mod: GP | Performed by: PHYSICAL THERAPIST

## 2022-07-12 PROCEDURE — 97110 THERAPEUTIC EXERCISES: CPT | Mod: GP | Performed by: PHYSICAL THERAPIST

## 2022-07-12 ASSESSMENT — ENCOUNTER SYMPTOMS: CONSTIPATION: 1

## 2022-07-12 NOTE — PROGRESS NOTES
PROGRESS  REPORT    Progress reporting period is from 6/10/2022 to 7/12/2022.       SUBJECTIVE  Subjective changes noted by patient:  .  Subjective: pt doing really well and having daily BM.  She reports she is continuing to use miralax daily.  Will start to cut down to every other day.  She brings in bowel diary and is having ype 4,5,6 bristol stool.  Reports easier to get stool out, still skinny yolis but longer.  She is using 2nd to last largest dilator and able to tolerate insertion.  Continues to have some L leg pain.  Pt getting KUB xray today to see if she has stool burden.    Current pain level is   .     Previous pain level was   Initial Pain level: 0/10.   Changes in function:  Yes (See Goal flowsheet attached for changes in current functional level)  Adverse reaction to treatment or activity: None    OBJECTIVE  Changes noted in objective findings:    Objective: tender B L > R levator ani muscles.  Improved ROM with contract/relax of PFM.  she reports relaxing feels like she is pushing.  Improved ab and Pelvic muscle coordinatoin for defecation     ASSESSMENT/PLAN  Updated problem list and treatment plan: Diagnosis 1:  constipation  Pain -  manual therapy, self management, education and home program  Decreased ROM/flexibility - manual therapy, therapeutic exercise, therapeutic activity and home program  Decreased strength - therapeutic exercise, therapeutic activities and home program  Impaired muscle performance - biofeedback, neuro re-education and home program  STG/LTGs have been met or progress has been made towards goals:  Yes (See Goal flow sheet completed today.)  Assessment of Progress: The patient's condition is improving.  Patient is meeting short term goals and is progressing towards long term goals.  Self Management Plans:  Patient has been instructed in a home treatment program.  Patient  has been instructed in self management of symptoms.  I have re-evaluated this patient and find that the  nature, scope, duration and intensity of the therapy is appropriate for the medical condition of the patient.  Ana Cristina continues to require the following intervention to meet STG and LTG's:  PT    Recommendations:  This patient would benefit from continued therapy.     Frequency:  1 X a month, once daily  Duration:  for 2 months        Please refer to the daily flowsheet for treatment today, total treatment time and time spent performing 1:1 timed codes.

## 2022-07-12 NOTE — PROGRESS NOTES
Assessment & Plan     (K59.00) Constipation, unspecified constipation type  (primary encounter diagnosis)  Comment:   Plan: Colorectal Surgery Referral            (Z78.0) Menopause present  Comment:   Plan:     (K62.5) Rectal bleeding  Comment: wiping after bm - after stranding   Plan: Colorectal Surgery Referral            (M99.05) Pelvic somatic dysfunction  Comment:   Plan: making progress with PT as well,     (D25.9) Uterine leiomyoma, unspecified location  Comment: per previous u/s in 2007 - asymptotic, per pt no vaginal bleeding at this time     Plan: US Pelvic Complete with Transvaginal          Discussed possible differential diagnosis for her symptoms.  Pt mostly concerned with any possible internal cause as Pt also raised any concerns and recommended to evaluate further          Check labs. refill sent.Cares and  treatment discussed follow. up if problem   Patient expressed understanding and agreement with treatment plan. All patient's questions were answered, will let me know if has more later.  Medications: Rx's: Reviewed the potential side effects/complications of medications prescribed.       Saige Blum MD  [facility] De La Paz is a 56 year old, presenting for the following health issues:  Constipation      Constipation     History of Present Illness       Reason for visit:  Constipation, and PT recommendation for imaging    She eats 2-3 servings of fruits and vegetables daily.She consumes 0 sweetened beverage(s) daily.She exercises with enough effort to increase her heart rate 30 to 60 minutes per day.  She exercises with enough effort to increase her heart rate 7 days per week.   She is taking medications regularly.             Review of Systems   Gastrointestinal: Positive for constipation.     Has ongoing issue with constipation, working with PT, worried about possible pelvic floor dysfunction.   Pt mostly concerned with any possible internal  cause as PT also raised  concerns and recommended to evaluate further      Not due for colonoscopy until next year.   admits to some  on and oFf bleeding on wiping on wiping recta area but no pain and she does not think it is  Vaginal.   no abdominal pain  Has gassy bloated feeling sometimes.  a friend was diagnosed with uterine cancer anD ovarian cancer etc so ut worried ,   ALTHOUGH NO FAM HX OF OVARIAN AND UTERINE CANCER       Constitutional, HEENT, cardiovascular, pulmonary, GI, , musculoskeletal, neuro, skin, endocrine and psych systems are negative, except as otherwise noted.      Objective    /70   Pulse 66   Temp 97.6  F (36.4  C) (Tympanic)   Resp 10   Wt 60.8 kg (134 lb)   LMP 11/25/2007   SpO2 99%   BMI 23.00 kg/m    Body mass index is 23 kg/m .  Physical Exam   GENERAL: healthy, alert and no distress  NECK: no adenopathy, no asymmetry, masses, or scars and thyroid normal to palpation  RESP: lungs clear to auscultation - no rales, rhonchi or wheezes  CV: regular rate and rhythm, normal S1 S2,   ABDOMEN: soft, nontender, no hepatosplenomegaly, no masses and bowel sounds normal  MS: no edema  NEURO: Normal strength and tone, mentation intact and speech normal  PSYCH: mentation appears normal, affect normal/bright                .  ..

## 2022-07-12 NOTE — PATIENT INSTRUCTIONS
Cares and symptomatic treatment discussed  Proceed with imaging and referral also placed to further evaluate    follow up if problem

## 2022-07-13 ENCOUNTER — ANCILLARY PROCEDURE (OUTPATIENT)
Dept: ULTRASOUND IMAGING | Facility: CLINIC | Age: 57
End: 2022-07-13
Attending: FAMILY MEDICINE
Payer: COMMERCIAL

## 2022-07-13 DIAGNOSIS — D25.9 UTERINE LEIOMYOMA, UNSPECIFIED LOCATION: ICD-10-CM

## 2022-07-13 PROCEDURE — 76856 US EXAM PELVIC COMPLETE: CPT

## 2022-07-14 ENCOUNTER — TELEPHONE (OUTPATIENT)
Dept: SURGERY | Facility: CLINIC | Age: 57
End: 2022-07-14

## 2022-07-14 NOTE — TELEPHONE ENCOUNTER
[facility] Call Center    Phone Message    May a detailed message be left on voicemail: yes     Reason for Call: Other: Per referral pt has DX for rectal bleeding. Writer sending TE as requested on scheduling protocol.      Action Taken: Message routed to:  [facility] COlon and rec    Travel Screening: Not Applicable

## 2022-07-14 NOTE — TELEPHONE ENCOUNTER
"Per chart review from referring provider:     \"Ana Cristina is a 56 year old, presenting for the following health issues: Constipation     Constipation        Reason for visit:  Constipation, and PT recommendation for imaging     She eats 2-3 servings of fruits and vegetables daily.She consumes 0 sweetened beverage(s) daily.She exercises with enough effort to increase her heart rate 30 to 60 minutes per day.  She exercises with enough effort to increase her heart rate 7 days per week.   She is taking medications regularly.        Review of Systems   Gastrointestinal: Positive for constipation.     Has ongoing issue with constipation, working with PT, worried about possible pelvic floor dysfunction.   Pt mostly concerned with any possible internal cause as PT also raised  concerns and recommended to evaluate further       Not due for colonoscopy until next year.   admits to some  on and oFf bleeding on wiping on wiping recta area but no pain and she does not think it is  Vaginal.   no abdominal pain  Has gassy bloated feeling sometimes.  a friend was diagnosed with uterine cancer anD ovarian cancer etc so ut worried ,   ALTHOUGH NO FAM HX OF OVARIAN AND UTERINE CANCER\"    After reviewing chart, pt is scheduled correctly. No action required.    Inez Lee, CMA    "

## 2022-07-18 NOTE — TELEPHONE ENCOUNTER
Diagnosis, Referred by & from: Rectal Bleeding   Appt date: 10/19/2022   NOTES STATUS DETAILS   OFFICE NOTE from referring provider [facility]:  7/12/22, 5/5/22 - [facility] OV with Dr. Blum   OFFICE NOTE from other specialist [facility]:  1/22/18, 12/15/17 - P[facility] OV with Dr. Ramey   DISCHARGE SUMMARY from hospital N/A    DISCHARGE REPORT from the ER N/A    OPERATIVE REPORT N/A    MEDICATION LIST Internal    LABS N/A    DIAGNOSTIC PROCEDURES     COLONOSCOPY Internal [facility]:  11/8/13 - Colonoscopy   IMAGING (DISC & REPORT)      ULTRASOUND  (ENDOANAL/ENDORECTAL) Internal [facility]:  7/13/22 - US Pelvic/Transabdominal

## 2022-07-29 ENCOUNTER — THERAPY VISIT (OUTPATIENT)
Dept: PHYSICAL THERAPY | Facility: CLINIC | Age: 57
End: 2022-07-29
Payer: COMMERCIAL

## 2022-07-29 DIAGNOSIS — M99.05 PELVIC SOMATIC DYSFUNCTION: Primary | ICD-10-CM

## 2022-07-29 DIAGNOSIS — K59.00 CONSTIPATION: ICD-10-CM

## 2022-07-29 PROCEDURE — 97140 MANUAL THERAPY 1/> REGIONS: CPT | Mod: GP | Performed by: PHYSICAL THERAPIST

## 2022-07-29 PROCEDURE — 97530 THERAPEUTIC ACTIVITIES: CPT | Mod: GP | Performed by: PHYSICAL THERAPIST

## 2022-08-26 ENCOUNTER — THERAPY VISIT (OUTPATIENT)
Dept: PHYSICAL THERAPY | Facility: CLINIC | Age: 57
End: 2022-08-26
Payer: COMMERCIAL

## 2022-08-26 DIAGNOSIS — K59.00 CONSTIPATION: ICD-10-CM

## 2022-08-26 DIAGNOSIS — M99.05 PELVIC SOMATIC DYSFUNCTION: Primary | ICD-10-CM

## 2022-08-26 PROCEDURE — 97530 THERAPEUTIC ACTIVITIES: CPT | Mod: GP | Performed by: PHYSICAL THERAPIST

## 2022-08-26 NOTE — PROGRESS NOTES
DISCHARGE REPORT    Progress reporting period is from 7/12/2022 to 8/26/2022.       SUBJECTIVE  Subjective changes noted by patient:  .  Subjective: Pt reports she is doing 100% better.  Started to try to weak off 1/2 cap of miralax from daily to on 2 days, off 1 day.  She reports that she feels she is emptying better.  Some days she reports can be more difficult and unsure why.  Pt is trying to pay attention to patterns.  She reports she is starting to feel some urges on her own.  She is using dilator and has no pain with size 5 of her dilator.  Does it daily    Current pain level is   .     Previous pain level was   Initial Pain level: 0/10.   Changes in function:  Yes (See Goal flowsheet attached for changes in current functional level)  Adverse reaction to treatment or activity: None    OBJECTIVE  Changes noted in objective findings:    Objective: 20 min face to face consult on home managment with dilator (ok to do 1 time a week based on her goals), bowel management with miralax, review of proper defecation and being aware of stool consistency.  Discussion of use of therawand for self stretch rectally and to use as feedback with contract/relax and push.     ASSESSMENT/PLAN  Updated problem list and treatment plan: Diagnosis 1:  constipation     STG/LTGs have been met or progress has been made towards goals:  Yes (See Goal flow sheet completed today.)  Assessment of Progress: The patient has met all of their long term goals.  Self Management Plans:  Patient is independent in a home treatment program.  Patient is independent in self management of symptoms.  I have re-evaluated this patient and find that the nature, scope, duration and intensity of the therapy is appropriate for the medical condition of the patient.  Ana Cristina continues to require the following intervention to meet STG and LTG's:  PT intervention is no longer required to meet STG/LTG.    Recommendations:  This patient is ready to be discharged from  therapy and continue their home treatment program.    Please refer to the daily flowsheet for treatment today, total treatment time and time spent performing 1:1 timed codes.

## 2022-09-28 ENCOUNTER — MYC MEDICAL ADVICE (OUTPATIENT)
Dept: SURGERY | Facility: CLINIC | Age: 57
End: 2022-09-28

## 2022-10-10 ENCOUNTER — HEALTH MAINTENANCE LETTER (OUTPATIENT)
Age: 57
End: 2022-10-10

## 2022-10-18 ASSESSMENT — ENCOUNTER SYMPTOMS
VOMITING: 0
CONSTIPATION: 1
EYE REDNESS: 0
HOARSE VOICE: 0
EYE WATERING: 0
SORE THROAT: 0
SINUS CONGESTION: 1
EYE PAIN: 0
RECTAL PAIN: 1
BLOOD IN STOOL: 0
BOWEL INCONTINENCE: 0
TROUBLE SWALLOWING: 0
BLOATING: 1
DIARRHEA: 1
SINUS PAIN: 0
HEARTBURN: 0
NECK MASS: 0
NAUSEA: 0
DOUBLE VISION: 0
EYE IRRITATION: 1
SMELL DISTURBANCE: 0
ABDOMINAL PAIN: 0
JAUNDICE: 0
TASTE DISTURBANCE: 0

## 2022-10-19 ENCOUNTER — OFFICE VISIT (OUTPATIENT)
Dept: SURGERY | Facility: CLINIC | Age: 57
End: 2022-10-19
Attending: FAMILY MEDICINE
Payer: COMMERCIAL

## 2022-10-19 ENCOUNTER — PRE VISIT (OUTPATIENT)
Dept: SURGERY | Facility: CLINIC | Age: 57
End: 2022-10-19

## 2022-10-19 VITALS
BODY MASS INDEX: 23.34 KG/M2 | SYSTOLIC BLOOD PRESSURE: 131 MMHG | HEART RATE: 71 BPM | HEIGHT: 64 IN | OXYGEN SATURATION: 99 % | DIASTOLIC BLOOD PRESSURE: 87 MMHG | WEIGHT: 136.7 LBS

## 2022-10-19 DIAGNOSIS — K62.5 RECTAL BLEEDING: Primary | ICD-10-CM

## 2022-10-19 DIAGNOSIS — K62.5 RECTAL BLEEDING: ICD-10-CM

## 2022-10-19 DIAGNOSIS — K59.00 CONSTIPATION, UNSPECIFIED CONSTIPATION TYPE: Primary | ICD-10-CM

## 2022-10-19 DIAGNOSIS — K59.00 CONSTIPATION, UNSPECIFIED CONSTIPATION TYPE: ICD-10-CM

## 2022-10-19 PROCEDURE — 99203 OFFICE O/P NEW LOW 30 MIN: CPT | Performed by: NURSE PRACTITIONER

## 2022-10-19 ASSESSMENT — PAIN SCALES - GENERAL: PAINLEVEL: NO PAIN (0)

## 2022-10-19 NOTE — PROGRESS NOTES
"Colon and Rectal Surgery Consult Clinic Note    Date: 10/19/2022     Referring provider:  Saige Blum MD  [address] [place] [address]     RE: Ana Cristina Ruth  : 1965  CLARISA: 10/19/2022    Ana Cristina Ruth is a very pleasant 57 year old female who presents today for rectal bleeding.    HPI:  Rectal bleeding was with harder bowel movements about a year and a half ago. Bleeding has become more frequently with constipation. Constipation and has done pelvic floor PT for this. This has helped with passing stools as has Miralax. Stopped Miralax and stools have gotten harder. Bleeding is every so often and only with wiping. Only after bowel movements. No prolapsing tissue. No pain with bowel movements. Can have some burning on skin occasionally. Stools were thin earlier in the year. This improved during PT and they are now wider. She is now done with physical therapy.  Normal colonoscopy in . No family history of colon cancer. Has a nephew with Crohn's disease.    Physical Examination:  /87 (BP Location: Left arm, Patient Position: Sitting, Cuff Size: Adult Regular)   Pulse 71   Ht 5' 4\"   Wt 136 lb 11.2 oz   LMP 2007   SpO2 99%   BMI 23.46 kg/m    General: alert, oriented, in no acute distress, sitting comfortably  HEENT: mucous membranes moist    Perianal external examination: Exam was chaperoned by Dominic Guevara, EMT-P   Perianal skin: Intact with no excoriation or lichenification.  Lesions: No evidence of an external lesion, nodularity, or induration in the perianal region.  Eversion of buttocks: There was not evidence of an anal fissure. Details: N/A.  Skin tags or external hemorrhoids: None.  Digital rectal examination: Was performed.   Sphincter tone: Good.  Palpable lesions: No.  Other: None.  Bimanual examination: was not performed    Anoscopy: Was performed.   Hemorrhoids: No significant internal hemorrhoids.  Lesions: No    Assessment/Plan: 57 year " old female with rectal bleeding with constipation.  Advise restarting daily MiraLAX as this had improved her constipation and bleeding.  However, bleeding has never completely gone away and is not always associated with constipation it.  No significant hemorrhoids on exam so I did recommend a colonoscopy at this time.  Can also start a daily fiber supplement as she does have some perianal excoriation and this could be a source of some blood with wiping.  Can also use a zinc barrier cream on perianal skin as needed. Patient's questions were answered to her stated satisfaction and she is in agreement with this plan.      Medical history:  Past Medical History:   Diagnosis Date     Herpes simplex without mention of complication     treats with Zovirax cream     Irritable bowel syndrome     bloating     Other forms of migraine, with intractable migraine, so stated, without mention of status migrainosus     since age 18     Rash and other nonspecific skin eruption     anus       Surgical history:  Past Surgical History:   Procedure Laterality Date     C/SECTION, LOW TRANSVERSE  1999     COLONOSCOPY  11/8/2013    Procedure: COLONOSCOPY;  COLONOSCOPY ;  Surgeon: Geeta Stevens MD;  Location:  GI       Problem list:    Patient Active Problem List    Diagnosis Date Noted     Uterine leiomyoma, unspecified location 07/12/2022     Priority: Medium     per previous u/s in 2007 - asymptomic, per pt no vaginal bleeding          Menopause present 07/12/2022     Priority: Medium     Pelvic somatic dysfunction 06/10/2022     Priority: Medium     Hx of hearing loss 04/04/2022     Priority: Medium     Mild -        Gluteal pain 02/28/2022     Priority: Medium     left / back of thigh mostly        Constipation 02/28/2022     Priority: Medium     Migraine without aura and without status migrainosus, not intractable 02/22/2021     Priority: Medium     Hyperlipidemia LDL goal <130 02/22/2021     Priority: Medium     Other type of  "intractable migraine 06/25/2005     Priority: Medium     Diagnosis updated by automated process. Provider to review and confirm.         Medications:  Current Outpatient Medications   Medication Sig Dispense Refill     atorvastatin (LIPITOR) 10 MG tablet Take 1 tablet (10 mg) by mouth daily 90 tablet 3     MULTIVITAMIN TABS   OR one tab daily  0     polyethylene glycol (MIRALAX) 17 GM/Dose powder Take 17 g (1 capful) by mouth daily 507 g 3     rizatriptan (MAXALT) 10 MG tablet Take 1 tablet (10 mg) by mouth at onset of headache for migraine 9 tablet 1     Tretinoin (TRETIN-X) 0.075 % CREA        Vitamin D3 (CHOLECALCIFEROL) 25 mcg (1000 units) tablet Take by mouth daily         Allergies:  Allergies   Allergen Reactions     No Known Drug Allergies        Family history:  Family History   Problem Relation Age of Onset     Thyroid Disease Father      Cerebrovascular Disease Father         TIA , at age 81      Lipids Mother      Diabetes No family hx of      Coronary Artery Disease No family hx of      Breast Cancer No family hx of      Colon Cancer No family hx of        Social history:  Social History     Tobacco Use     Smoking status: Never     Smokeless tobacco: Never   Substance Use Topics     Alcohol use: Yes     Comment: occasionally    Marital status: .    Nursing Notes:   Dominic Guevara, EMT  10/19/2022 12:52 PM  Sign at exiting of workspace  Chief Complaint   Patient presents with     New Patient     Rectal Bleed         Vitals:    10/19/22 1250   BP: 131/87   BP Location: Left arm   Patient Position: Sitting   Cuff Size: Adult Regular   Pulse: 71   SpO2: 99%   Weight: 136 lb 11.2 oz   Height: 5' 4\"       Body mass index is 23.46 kg/m .     Dominic Guevara, EMT- P         20 minutes spent on the date of encounter (excluding time performing procedures) performing chart review, history and exam, documentation and further activities as noted above with an additional 2 minutes for anoscopy.     Carlene colon" OVIDIO Rossi, NP-C  Colon and Rectal Surgery   [facility]    This note was created using speech recognition software and may contain unintended word substitutions.

## 2022-10-19 NOTE — LETTER
"10/19/2022       RE: Ana Cristina Ruth  [id number]  [place] [address]     Dear Colleague,    Thank you for referring your patient, Ana Cristina Ruth, to the [facility] COLON AND RECTAL SURGERY CLINIC [place] [facility]. Please see a copy of my visit note below.    Colon and Rectal Surgery Consult Clinic Note    Date: 10/19/2022     Referring provider:  Saige Blum MD  [address] [place] [address]     RE: Ana Cristina Ruth  : 1965  CLARISA: 10/19/2022    Ana Cristina Ruth is a very pleasant 57 year old female who presents today for rectal bleeding.    HPI:  Rectal bleeding was with harder bowel movements about a year and a half ago. Bleeding has become more frequently with constipation. Constipation and has done pelvic floor PT for this. This has helped with passing stools as has Miralax. Stopped Miralax and stools have gotten harder. Bleeding is every so often and only with wiping. Only after bowel movements. No prolapsing tissue. No pain with bowel movements. Can have some burning on skin occasionally. Stools were thin earlier in the year. This improved during PT and they are now wider. She is now done with physical therapy.  Normal colonoscopy in . No family history of colon cancer. Has a nephew with Crohn's disease.    Physical Examination:  /87 (BP Location: Left arm, Patient Position: Sitting, Cuff Size: Adult Regular)   Pulse 71   Ht 5' 4\"   Wt 136 lb 11.2 oz   LMP 2007   SpO2 99%   BMI 23.46 kg/m    General: alert, oriented, in no acute distress, sitting comfortably  HEENT: mucous membranes moist    Perianal external examination: Exam was chaperoned by Dominic Guevara, EMT-P   Perianal skin: Intact with no excoriation or lichenification.  Lesions: No evidence of an external lesion, nodularity, or induration in the perianal region.  Eversion of buttocks: There was not " evidence of an anal fissure. Details: N/A.  Skin tags or external hemorrhoids: None.  Digital rectal examination: Was performed.   Sphincter tone: Good.  Palpable lesions: No.  Other: None.  Bimanual examination: was not performed    Anoscopy: Was performed.   Hemorrhoids: No significant internal hemorrhoids.  Lesions: No    Assessment/Plan: 57 year old female with rectal bleeding with constipation.  Advise restarting daily MiraLAX as this had improved her constipation and bleeding.  However, bleeding has never completely gone away and is not always associated with constipation it.  No significant hemorrhoids on exam so I did recommend a colonoscopy at this time.  Can also start a daily fiber supplement as she does have some perianal excoriation and this could be a source of some blood with wiping.  Can also use a zinc barrier cream on perianal skin as needed. Patient's questions were answered to her stated satisfaction and she is in agreement with this plan.      Medical history:  Past Medical History:   Diagnosis Date     Herpes simplex without mention of complication     treats with Zovirax cream     Irritable bowel syndrome     bloating     Other forms of migraine, with intractable migraine, so stated, without mention of status migrainosus     since age 18     Rash and other nonspecific skin eruption     anus       Surgical history:  Past Surgical History:   Procedure Laterality Date     C/SECTION, LOW TRANSVERSE  1999     COLONOSCOPY  11/8/2013    Procedure: COLONOSCOPY;  COLONOSCOPY ;  Surgeon: Geeta Stevens MD;  Location:  GI       Problem list:    Patient Active Problem List    Diagnosis Date Noted     Uterine leiomyoma, unspecified location 07/12/2022     Priority: Medium     per previous u/s in 2007 - asymptomic, per pt no vaginal bleeding          Menopause present 07/12/2022     Priority: Medium     Pelvic somatic dysfunction 06/10/2022     Priority: Medium     Hx of hearing loss 04/04/2022      Priority: Medium     Mild -        Gluteal pain 02/28/2022     Priority: Medium     left / back of thigh mostly        Constipation 02/28/2022     Priority: Medium     Migraine without aura and without status migrainosus, not intractable 02/22/2021     Priority: Medium     Hyperlipidemia LDL goal <130 02/22/2021     Priority: Medium     Other type of intractable migraine 06/25/2005     Priority: Medium     Diagnosis updated by automated process. Provider to review and confirm.         Medications:  Current Outpatient Medications   Medication Sig Dispense Refill     atorvastatin (LIPITOR) 10 MG tablet Take 1 tablet (10 mg) by mouth daily 90 tablet 3     MULTIVITAMIN TABS   OR one tab daily  0     polyethylene glycol (MIRALAX) 17 GM/Dose powder Take 17 g (1 capful) by mouth daily 507 g 3     rizatriptan (MAXALT) 10 MG tablet Take 1 tablet (10 mg) by mouth at onset of headache for migraine 9 tablet 1     Tretinoin (TRETIN-X) 0.075 % CREA        Vitamin D3 (CHOLECALCIFEROL) 25 mcg (1000 units) tablet Take by mouth daily         Allergies:  Allergies   Allergen Reactions     No Known Drug Allergies        Family history:  Family History   Problem Relation Age of Onset     Thyroid Disease Father      Cerebrovascular Disease Father         TIA , at age 81      Lipids Mother      Diabetes No family hx of      Coronary Artery Disease No family hx of      Breast Cancer No family hx of      Colon Cancer No family hx of        Social history:  Social History     Tobacco Use     Smoking status: Never     Smokeless tobacco: Never   Substance Use Topics     Alcohol use: Yes     Comment: occasionally    Marital status: .    Nursing Notes:   Dominic Guevara, EMT  10/19/2022 12:52 PM  Sign at exiting of workspace  Chief Complaint   Patient presents with     New Patient     Rectal Bleed         Vitals:    10/19/22 1250   BP: 131/87   BP Location: Left arm   Patient Position: Sitting   Cuff Size: Adult Regular   Pulse: 71   SpO2:  "99%   Weight: 136 lb 11.2 oz   Height: 5' 4\"       Body mass index is 23.46 kg/m .     Dominic Guevara, EMT- P         20 minutes spent on the date of encounter (excluding time performing procedures) performing chart review, history and exam, documentation and further activities as noted above with an additional 2 minutes for anoscopy.     OVIDIO Shell, NP-C  Colon and Rectal Surgery   [facility]    This note was created using speech recognition software and may contain unintended word substitutions.  "

## 2022-10-19 NOTE — NURSING NOTE
"Chief Complaint   Patient presents with     New Patient     Rectal Bleed         Vitals:    10/19/22 1250   BP: 131/87   BP Location: Left arm   Patient Position: Sitting   Cuff Size: Adult Regular   Pulse: 71   SpO2: 99%   Weight: 136 lb 11.2 oz   Height: 5' 4\"       Body mass index is 23.46 kg/m .     Dominic Guevara, EMT- P    "

## 2022-10-19 NOTE — PATIENT INSTRUCTIONS
Use one tablespoon of powder fiber supplement 1-3 times per day, starting at once per day and working your way up as needed, we recommend drinking 6-8 glasses of water a day with this  Calmoseptine cream as needed to perianal skin  Colonscopy

## 2022-10-21 ENCOUNTER — TELEPHONE (OUTPATIENT)
Dept: GASTROENTEROLOGY | Facility: CLINIC | Age: 57
End: 2022-10-21

## 2022-10-21 DIAGNOSIS — Z01.812 PRE-PROCEDURE LAB EXAM: Primary | ICD-10-CM

## 2022-11-08 ENCOUNTER — TELEPHONE (OUTPATIENT)
Dept: GASTROENTEROLOGY | Facility: CLINIC | Age: 57
End: 2022-11-08

## 2022-11-08 DIAGNOSIS — K59.00 CONSTIPATION, UNSPECIFIED CONSTIPATION TYPE: Primary | ICD-10-CM

## 2022-11-08 RX ORDER — BISACODYL 5 MG
TABLET, DELAYED RELEASE (ENTERIC COATED) ORAL
Qty: 4 TABLET | Refills: 0 | Status: SHIPPED | OUTPATIENT
Start: 2022-11-08 | End: 2023-05-10

## 2022-11-08 NOTE — TELEPHONE ENCOUNTER
Pre assessment questions completed for upcoming colonsocopy procedure scheduled on 11.16.2022    COVID test: 11.12.2022    Reviewed procedural arrival time 1330 and facility location [facility].    Designated  policy reviewed. Instructed to have someone stay 24 hours post procedure.     Anticoagulation/blood thinners? no    Electronic implanted devices? no    Diabetic? no    Reviewed extended prep instructions with patient. No fiber/iron supplements or foods that contain nuts/seeds prior to procedure.     Prep instructions sent via [employer].  Prep prescription sent to    [id number] - [place] - [id number] AT [facility] [address]    Patient verbalized understanding and had no questions or concerns at this time.    Mayela Granger RN

## 2022-11-09 ENCOUNTER — E-VISIT (OUTPATIENT)
Dept: URGENT CARE | Facility: CLINIC | Age: 57
End: 2022-11-09
Payer: COMMERCIAL

## 2022-11-09 DIAGNOSIS — R19.7 DIARRHEA OF PRESUMED INFECTIOUS ORIGIN: Primary | ICD-10-CM

## 2022-11-09 PROCEDURE — 99421 OL DIG E/M SVC 5-10 MIN: CPT | Performed by: PHYSICIAN ASSISTANT

## 2022-11-09 NOTE — TELEPHONE ENCOUNTER
The Pt called in to report that she has been having some loose stools since finishing a round of antibiotics used to treat a previous UTI. She reports finishing them about 2 weeks ago.   She continues to have daily loose stools. Advised the Pt to contact PCP to discuss possibly doing stool testing (rule out C-diff). The Pt will call today.     Advised her if anything comes back abnormal, to let us know.     Machelle Hernández RN   -[facility]

## 2022-11-09 NOTE — PATIENT INSTRUCTIONS
"  Thank you for choosing us for your care. Given your symptoms, I would like you to do a lab-only visit to determine what is causing them.  I have placed the orders.  Please schedule an appointment with the lab right here in [facility], or call [phone].  I will let you know when the results are back and next steps to take.    Viral Gastroenteritis (Adult)    Gastroenteritis is commonly called the \"stomach flu,\" although it has nothing to do with influenza. It is most often caused by a virus that affects the stomach and intestinal tract and usually lasts from 2 to 7 days. Common viruses causing gastroenteritis include norovirus, rotavirus, and hepatitis A. Non-viral causes of gastroenteritis include bacteria, parasites, and toxins.  The danger from repeated vomiting or diarrhea is dehydration. This is the loss of too much fluid from the body. When this occurs, body fluids must be replaced. Antibiotics don't help with this illness because it is usually viral. Simple home treatment will be helpful.  Symptoms of viral gastroenteritis may include:    Watery, loose stools    Stomach pain or abdominal cramps    Fever and chills    Nausea and vomiting    Loss of bowel control    Headache  Home care  Gastroenteritis is transmitted by contact with the stool or vomit of an infected person. This can occur from person to person or from contact with a contaminated surface.  Follow these guidelines when caring for yourself at home:    If symptoms are severe, rest at home for the next 24 hours or until you are feeling better.    Wash your hands with soap and water or use alcohol-based  to prevent the spread of infection. Wash your hands after touching anyone who is sick.    Wash your hands or use alcohol-based  after using the toilet and before meals. Clean the toilet after each use.  Remember these tips when preparing food:    People with diarrhea should not prepare or serve food to others. When preparing " foods, wash your hands before and after.    Wash your hands after using cutting boards, countertops, knives, or utensils that have been in contact with raw food.    Dry your hands with a single use towel.    Keep uncooked meats away from cooked and ready-to-eat foods.  Medicine  You may use acetaminophen or NSAID medicines like ibuprofen or naproxen to control fever unless another medicine was given. If you have chronic liver or kidney disease, talk with your healthcare provider before using these medicines. Also talk with your provider if you've had a stomach ulcer or gastrointestinal bleeding. Don't give aspirin to anyone under 18 years of age who is ill with a fever. It may cause severe liver damage. Don't use NSAIDS is you are already taking one for another condition (like arthritis) or are on aspirin (such as for heart disease or after a stroke).  If medicine for vomiting or diarrhea are prescribed, take these only as directed. Nausea and diarrhea medicines are generally OK unless you have bleeding, fever, or severe abdominal pain.  Diet  Follow these guidelines for food:    Water and liquids are important so you don't get dehydrated. Drink a small amount at a time or suck on ice chips if you are vomiting.    If you eat, avoid fatty, greasy, spicy, or fried foods.    Don't eat dairy if you have diarrhea. This can make diarrhea worse.    Avoid tobacco, alcohol, and caffeine which may worsen symptoms.  During the first 24 hours (the first full day), follow the diet below:    Beverages. Sports drinks, soft drinks without caffeine, ginger ale, mineral water (plain or flavored), decaffeinated tea and coffee. If you are very dehydrated, sports drinks aren't a good choice. They have too much sugar and not enough electrolytes. In this case, commercially available products called oral rehydration solutions, are best.    Soups. Eat clear broth, consommé, and bouillon.    Desserts. Eat gelatin, ice pops, and fruit juice  bars.  During the next 24 hours (the second day), you may add the following to the above:    Hot cereal, plain toast, bread, rolls, and crackers    Plain noodles, rice, mashed potatoes, chicken noodle or rice soup    Unsweetened canned fruit (avoid pineapple), bananas    Limit fat intake to less than 15 grams per day. Do this by avoiding margarine, butter, oils, mayonnaise, sauces, gravies, fried foods, peanut butter, meat, poultry, and fish.    Limit fiber and avoid raw or cooked vegetables, fresh fruits (except bananas), and bran cereals.    Limit caffeine and chocolate. Don't use spices or seasonings other than salt.    Limit dairy products.    Avoid alcohol.  During the next 24 hours:    Gradually resume a normal diet as you feel better and your symptoms improve.    If at any time it starts getting worse again, go back to clear liquids until you feel better.  Follow-up care  Follow up with your healthcare provider, or as advised. Call your provider if you don't get better within 24 hours or if diarrhea lasts more than a week. Also follow up if you are unable to keep down liquids and get dehydrated. If a stool (diarrhea) sample was taken, call as directed for the results.  Call 911  Call 911 if any of these occur:    Trouble breathing    Chest pain    Confused    Severe drowsiness or trouble awakening    Fainting or loss of consciousness    Rapid heart rate    Seizure    Stiff neck  When to seek medical advice  Call your healthcare provider right away if any of these occur:    Abdominal pain that gets worse    Continued vomiting (unable to keep liquids down)    Frequent diarrhea (more than 5 times a day)    Blood in vomit or stool (black or red color)    Dark urine, reduced urine output, or extreme thirst    Weakness or dizziness    Drowsiness    Fever of 100.4 F (38 C) or higher, or as directed by your healthcare provider    New rash  [employer] last reviewed this educational content on 6/1/2018 2000-2021 [employer]  [employer]. All rights reserved. This information is not intended as a substitute for professional medical care. Always follow your healthcare professional's instructions.

## 2022-11-10 ENCOUNTER — LAB (OUTPATIENT)
Dept: LAB | Facility: CLINIC | Age: 57
End: 2022-11-10
Payer: COMMERCIAL

## 2022-11-10 DIAGNOSIS — R19.7 DIARRHEA OF PRESUMED INFECTIOUS ORIGIN: ICD-10-CM

## 2022-11-10 LAB — C DIFF TOX B STL QL: NEGATIVE

## 2022-11-10 PROCEDURE — 87493 C DIFF AMPLIFIED PROBE: CPT | Mod: 59

## 2022-11-10 PROCEDURE — 87506 IADNA-DNA/RNA PROBE TQ 6-11: CPT

## 2022-11-11 NOTE — TELEPHONE ENCOUNTER
11.10.2022 lab tests for C. Diff and enteric bacteria & Virus panel are negative.    Mayela Granger RN

## 2022-11-12 ENCOUNTER — LAB (OUTPATIENT)
Dept: LAB | Facility: CLINIC | Age: 57
End: 2022-11-12
Payer: COMMERCIAL

## 2022-11-12 DIAGNOSIS — Z01.812 PRE-PROCEDURE LAB EXAM: ICD-10-CM

## 2022-11-12 PROCEDURE — U0005 INFEC AGEN DETEC AMPLI PROBE: HCPCS | Mod: 90 | Performed by: PATHOLOGY

## 2022-11-12 PROCEDURE — 99000 SPECIMEN HANDLING OFFICE-LAB: CPT | Performed by: PATHOLOGY

## 2022-11-12 PROCEDURE — U0003 INFECTIOUS AGENT DETECTION BY NUCLEIC ACID (DNA OR RNA); SEVERE ACUTE RESPIRATORY SYNDROME CORONAVIRUS 2 (SARS-COV-2) (CORONAVIRUS DISEASE [COVID-19]), AMPLIFIED PROBE TECHNIQUE, MAKING USE OF HIGH THROUGHPUT TECHNOLOGIES AS DESCRIBED BY CMS-2020-01-R: HCPCS | Mod: 90 | Performed by: PATHOLOGY

## 2022-11-13 LAB — SARS-COV-2 RNA RESP QL NAA+PROBE: NEGATIVE

## 2022-11-16 ENCOUNTER — TRANSFERRED RECORDS (OUTPATIENT)
Dept: HEALTH INFORMATION MANAGEMENT | Facility: CLINIC | Age: 57
End: 2022-11-16

## 2023-03-27 ENCOUNTER — ALLIED HEALTH/NURSE VISIT (OUTPATIENT)
Dept: RESEARCH | Facility: CLINIC | Age: 58
End: 2023-03-27
Payer: COMMERCIAL

## 2023-03-27 DIAGNOSIS — Z00.6 EXAMINATION OF PARTICIPANT IN CLINICAL TRIAL: Primary | ICD-10-CM

## 2023-03-27 NOTE — PROGRESS NOTES
Informed Consent Process Documentation  Study Name and Protocol Number:    Effects of colorectal surgery on the colonic microbiome   [id number]    ICF Version Date / IRB Effective Date: Version 2.0 / IRB Effective Date: 1/4/2023    Date of Approach/Consent: 3/11/2023    The subject was screened and meets all of the inclusion criteria and none of the exclusion criteria is met.      The subject was told:  -that the study involves research   -the purpose of the research study  -the expected duration of the study and the approximate number of subject sought  -of procedures that are identified as experimental  -of reasonably foreseeable risks or discomforts to the subject  -of any benefits to the subject or others that may be expected from the research  -of alternative procedures and/or treatment  -how the confidentiality of records would be maintained  -whether or not compensation and medical treatments are available should injury occur as a result of the study  -who to contact if they have questions related to the research study or questions regarding research subjects' rights  -that participation is completely voluntary and that their decision to or not to participate will have no impact on their relationships with the [facility] and the staff    No study procedures were completed prior to the consent being obtained.  The use of historical information (lab or assessments) used for the purpose of the study was approved by subject.  The subject was fully aware that we would be reviewing their medical record for the study.    The subject demonstrated an understanding of what the study involved.  Specifically, how this study differed from standard of care at our center and what was required of the subject as part of the study.    The subject reviewed the consent form and was given the opportunity to ask questions before signing.  Questions and concerns were answered by the study staff and/or study physician.    A copy of the  signed informed consent document was provided to the subject.  [x] Yes  [] No     The subject was offered a copy of the signed informed consent but declined. [] Yes [x] No     Questions to Evaluate Subject Comprehension of Study:     Question: Adequate Response? If No, explain what actions were taken   What is being studied? [x] Yes  [] No   If you participate, what will be different than if you decide not to participate?  [x] Yes  [] No   How long will the study last; will you be required to return for visits? [x] Yes  [] No   What kinds of risks are there? [x] Yes  [] No         Sign: Tisha Beth

## 2023-05-04 ASSESSMENT — ENCOUNTER SYMPTOMS
CHILLS: 0
PALPITATIONS: 0
CONSTIPATION: 0
EYE PAIN: 0
COUGH: 0
FREQUENCY: 0
ABDOMINAL PAIN: 0
DIARRHEA: 0
NERVOUS/ANXIOUS: 0
PARESTHESIAS: 0
HEARTBURN: 0
SHORTNESS OF BREATH: 0
DYSURIA: 0
HEMATOCHEZIA: 0
HEADACHES: 1
SORE THROAT: 0
JOINT SWELLING: 0
ARTHRALGIAS: 0
HEMATURIA: 0
WEAKNESS: 0
FEVER: 0
MYALGIAS: 0
BREAST MASS: 0
NAUSEA: 0
DIZZINESS: 0

## 2023-05-10 ENCOUNTER — OFFICE VISIT (OUTPATIENT)
Dept: FAMILY MEDICINE | Facility: CLINIC | Age: 58
End: 2023-05-10
Payer: COMMERCIAL

## 2023-05-10 VITALS
WEIGHT: 134.4 LBS | RESPIRATION RATE: 16 BRPM | HEIGHT: 64 IN | HEART RATE: 66 BPM | DIASTOLIC BLOOD PRESSURE: 76 MMHG | OXYGEN SATURATION: 98 % | TEMPERATURE: 97.6 F | BODY MASS INDEX: 22.94 KG/M2 | SYSTOLIC BLOOD PRESSURE: 112 MMHG

## 2023-05-10 DIAGNOSIS — Z12.31 BREAST CANCER SCREENING BY MAMMOGRAM: ICD-10-CM

## 2023-05-10 DIAGNOSIS — Z12.4 CERVICAL CANCER SCREENING: ICD-10-CM

## 2023-05-10 DIAGNOSIS — G43.009 MIGRAINE WITHOUT AURA AND WITHOUT STATUS MIGRAINOSUS, NOT INTRACTABLE: ICD-10-CM

## 2023-05-10 DIAGNOSIS — Z83.49 FAMILY HISTORY OF THYROID DISORDER: ICD-10-CM

## 2023-05-10 DIAGNOSIS — K59.09 OTHER CONSTIPATION: ICD-10-CM

## 2023-05-10 DIAGNOSIS — Z00.00 ROUTINE HISTORY AND PHYSICAL EXAMINATION OF ADULT: Primary | ICD-10-CM

## 2023-05-10 DIAGNOSIS — E78.5 HYPERLIPIDEMIA LDL GOAL <130: ICD-10-CM

## 2023-05-10 LAB
ALBUMIN SERPL BCG-MCNC: 4.9 G/DL (ref 3.5–5.2)
ALP SERPL-CCNC: 88 U/L (ref 35–104)
ALT SERPL W P-5'-P-CCNC: 28 U/L (ref 10–35)
ANION GAP SERPL CALCULATED.3IONS-SCNC: 13 MMOL/L (ref 7–15)
AST SERPL W P-5'-P-CCNC: 43 U/L (ref 10–35)
BILIRUB SERPL-MCNC: 0.6 MG/DL
BUN SERPL-MCNC: 12.1 MG/DL (ref 8–23)
CALCIUM SERPL-MCNC: 9.8 MG/DL (ref 8.6–10)
CHLORIDE SERPL-SCNC: 106 MMOL/L (ref 98–107)
CHOLEST SERPL-MCNC: 188 MG/DL
CREAT SERPL-MCNC: 0.93 MG/DL (ref 0.51–0.95)
DEPRECATED HCO3 PLAS-SCNC: 25 MMOL/L (ref 22–29)
GFR SERPL CREATININE-BSD FRML MDRD: 70 ML/MIN/1.73M2
GLUCOSE SERPL-MCNC: 80 MG/DL (ref 70–99)
HDLC SERPL-MCNC: 57 MG/DL
LDLC SERPL CALC-MCNC: 115 MG/DL
NONHDLC SERPL-MCNC: 131 MG/DL
POTASSIUM SERPL-SCNC: 4.3 MMOL/L (ref 3.4–5.3)
PROT SERPL-MCNC: 7.4 G/DL (ref 6.4–8.3)
SODIUM SERPL-SCNC: 144 MMOL/L (ref 136–145)
TRIGL SERPL-MCNC: 82 MG/DL
TSH SERPL DL<=0.005 MIU/L-ACNC: 1.7 UIU/ML (ref 0.3–4.2)

## 2023-05-10 PROCEDURE — 99213 OFFICE O/P EST LOW 20 MIN: CPT | Mod: 25 | Performed by: FAMILY MEDICINE

## 2023-05-10 PROCEDURE — 36415 COLL VENOUS BLD VENIPUNCTURE: CPT | Performed by: FAMILY MEDICINE

## 2023-05-10 PROCEDURE — G0145 SCR C/V CYTO,THINLAYER,RESCR: HCPCS | Performed by: FAMILY MEDICINE

## 2023-05-10 PROCEDURE — 99396 PREV VISIT EST AGE 40-64: CPT | Performed by: FAMILY MEDICINE

## 2023-05-10 PROCEDURE — 84443 ASSAY THYROID STIM HORMONE: CPT | Performed by: FAMILY MEDICINE

## 2023-05-10 PROCEDURE — 80053 COMPREHEN METABOLIC PANEL: CPT | Performed by: FAMILY MEDICINE

## 2023-05-10 PROCEDURE — 87624 HPV HI-RISK TYP POOLED RSLT: CPT | Performed by: FAMILY MEDICINE

## 2023-05-10 PROCEDURE — 80061 LIPID PANEL: CPT | Performed by: FAMILY MEDICINE

## 2023-05-10 RX ORDER — ATORVASTATIN CALCIUM 10 MG/1
10 TABLET, FILM COATED ORAL DAILY
Qty: 90 TABLET | Refills: 3 | Status: SHIPPED | OUTPATIENT
Start: 2023-05-10 | End: 2024-05-23

## 2023-05-10 RX ORDER — RIZATRIPTAN BENZOATE 10 MG/1
10 TABLET ORAL
Qty: 9 TABLET | Refills: 1 | Status: SHIPPED | OUTPATIENT
Start: 2023-05-10 | End: 2024-05-23

## 2023-05-10 RX ORDER — POLYETHYLENE GLYCOL 3350 17 G/17G
1 POWDER, FOR SOLUTION ORAL DAILY
Qty: 507 G | Refills: 3 | Status: SHIPPED | OUTPATIENT
Start: 2023-05-10 | End: 2024-05-23

## 2023-05-10 ASSESSMENT — PAIN SCALES - GENERAL: PAINLEVEL: NO PAIN (0)

## 2023-05-10 NOTE — PROGRESS NOTES
SUBJECTIVE:   CC: Ana Cristina is an 57 year old who presents for preventive health visit.        View : No data to display.            Patient has been advised of split billing requirements and indicates understanding: Yes  Healthy Habits:     Getting at least 3 servings of Calcium per day:  Yes    Bi-annual eye exam:  Yes    Dental care twice a year:  Yes    Sleep apnea or symptoms of sleep apnea:  None    Diet:  Regular (no restrictions)    Frequency of exercise:  6-7 days/week    Duration of exercise:  45-60 minutes    Taking medications regularly:  Yes    Medication side effects:  None    PHQ-2 Total Score: 0    Additional concerns today:  Yes          PROBLEMS TO ADD ON...  Hyperlipidemia Follow-Up      Are you regularly taking any medication or supplement to lower your cholesterol?   Yes- see epic    Are you having muscle aches or other side effects that you think could be caused by your cholesterol lowering medication?  No    Migraine     Since your last clinic visit, how have your headaches changed?  No change    How often are you getting headaches or migraines?  oce every couple of months, so they are well controlled.    Are you able to do normal daily activities when you have a migraine? Yes    Are you taking rescue/relief medications? (Select all that apply) Maxalt    How helpful is your rescue/relief medication?  I get total relief    Are you taking any medications to prevent migraines? (Select all that apply)  No    In the past 4 weeks, how often have you gone to urgent care or the emergency room because of your headaches?  0      Today's PHQ-2 Score:       5/4/2023     2:46 PM   PHQ-2 ( 1999 Pf[employer])   Q1: Little interest or pleasure in doing things 0   Q2: Feeling down, depressed or hopeless 0   PHQ-2 Score 0   Q1: Little interest or pleasure in doing things Not at all    Not at all   Q2: Feeling down, depressed or hopeless Not at all    Not at all   PHQ-2 Score 0    0           Social History      Tobacco Use     Smoking status: Never     Smokeless tobacco: Never   Vaping Use     Vaping status: Not on file   Substance Use Topics     Alcohol use: Yes     Comment: occasionally             5/4/2023     2:46 PM   Alcohol Use   Prescreen: >3 drinks/day or >7 drinks/week? No          View : No data to display.              Reviewed orders with patient.  Reviewed health maintenance and updated orders accordingly - Yes  Patient Active Problem List   Diagnosis     Other type of intractable migraine     Migraine without aura and without status migrainosus, not intractable     Hyperlipidemia LDL goal <130     Gluteal pain     Constipation     Hx of hearing loss     Pelvic somatic dysfunction     Uterine leiomyoma, unspecified location     Menopause present     Past Surgical History:   Procedure Laterality Date     C/SECTION, LOW TRANSVERSE  1999     COLONOSCOPY  11/8/2013    Procedure: COLONOSCOPY;  COLONOSCOPY ;  Surgeon: Geeta Stevens MD;  Location:  GI       Social History     Tobacco Use     Smoking status: Never     Smokeless tobacco: Never   Vaping Use     Vaping status: Not on file   Substance Use Topics     Alcohol use: Yes     Comment: occasionally     Family History   Problem Relation Age of Onset     Thyroid Disease Father      Cerebrovascular Disease Father         TIA , at age 81      Lipids Mother      Hyperlipidemia Mother      Hyperlipidemia Sister      Hyperlipidemia Brother      Breast Cancer Other      Mental Illness Other      Diabetes No family hx of      Coronary Artery Disease No family hx of      Colon Cancer No family hx of            Breast Cancer Screening:        3/28/2022     7:54 PM 4/13/2022     4:03 PM   Breast CA Risk Assessment (FHS-7)   Do you have a family history of breast, colon, or ovarian cancer? Yes No / Unknown         Mammogram Screening: Recommended mammography every 1-2 years with patient discussion and risk factor consideration  Pertinent mammograms are reviewed  under the imaging tab.    History of abnormal Pap smear: NO - age 30- 65 PAP every 3 years recommended      6/8/2007    12:00 AM 12/23/2005    12:00 AM   PAP / HPV   PAP (Historical) NIL   NIL       Reviewed and updated as needed this visit by clinical staff                  Reviewed and updated as needed this visit by Provider                 Past Medical History:   Diagnosis Date     Herpes simplex without mention of complication     treats with Zovirax cream     Irritable bowel syndrome     bloating     Other forms of migraine, with intractable migraine, so stated, without mention of status migrainosus     since age 18     Rash and other nonspecific skin eruption     anus        CONSTITUTIONAL: NEGATIVE for fever, chills, change in weight  INTEGUMENTARY/SKIN: NEGATIVE for worrisome rashes, moles or lesions  EYES: NEGATIVE for vision changes or irritation  ENT: NEGATIVE for ear, mouth and throat problems  RESP: NEGATIVE for significant cough or SOB  BREAST: NEGATIVE for masses, tenderness or discharge  CV: NEGATIVE for chest pain, palpitations or peripheral edema  GI: NEGATIVE for nausea, abdominal pain, heartburn, or change in bowel habits. Constipation has improved and pelvic floor exercises hae helped a lot her colonoscopy was normal last year and miralax is helping and wants refill   : NEGATIVE for unusual urinary or vaginal symptoms. No vaginal bleeding.   menopausal female: no unusual urinary symptoms and no unusual vaginal symptoms  MUSCULOSKELETAL: NEGATIVE for significant arthralgias or myalgia  NEURO: NEGATIVE for weakness, dizziness or paresthesias  ENDOCRINE: NEGATIVE for temperature intolerance, skin/hair changes. Has FAM hx of thyroid in da and wants to get checked sometimes have temperature sensitivity and intolerance to heat sometimes   PSYCHIATRIC: NEGATIVE for changes in mood or affect      OBJECTIVE:   LMP 11/25/2007   Physical Exam  GENERAL: healthy, alert and no distress  EYES: Eyes  grossly normal to inspection, PERRL and conjunctivae and sclerae normal  HENT: ear canals and TM's normal, nose and mouth without ulcers or lesions  NECK: no adenopathy, no asymmetry, masses, or scars and thyroid normal to palpation  RESP: lungs clear to auscultation - no rales, rhonchi or wheezes  BREAST: normal without masses, tenderness or nipple discharge and no palpable axillary masses or adenopathy  CV: regular rate and rhythm, normal S1 S2, no S3 or S4, no murmur, click or rub, no peripheral edema   ABDOMEN: soft, nontender, no hepatosplenomegaly, no masses and bowel sounds normal   (female): normal female external genitalia, normal urethral meatus, vaginal mucosa pink, moist, well rugated, and normal cervix/adnexa/uterus without masses or discharge  RECTAL: normal sphincter tone, no rectal masses  MS: no gross musculoskeletal defects noted, no edema  SKIN: no suspicious lesions or rashes  NEURO: Normal strength and tone, mentation intact and speech normal  PSYCH: mentation appears normal, affect normal/bright        ASSESSMENT/PLAN:   (Z00.00) Routine history and physical examination of adult  (primary encounter diagnosis)  Comment:   Plan: Pap Screen with HPV - recommended age 30 - 65         years, REVIEW OF HEALTH MAINTENANCE PROTOCOL         ORDERS            (Z12.4) Cervical cancer screening  Comment:   Plan: Pap Screen with HPV - recommended age 30 - 65         years            (E78.5) Hyperlipidemia LDL goal <130  Comment:   Plan: Lipid panel reflex to direct LDL Non-fasting,         Comprehensive metabolic panel, atorvastatin         (LIPITOR) 10 MG tablet        Check labs adjust med's if needed     (K59.09) Other constipation  Comment: stable now and ca use refill   Plan: polyethylene glycol (MIRALAX) 17 GM/Dose powder            (G43.009) Migraine without aura and without status migrainosus, not intractable  Comment: well controlled.  Plan: rizatriptan (MAXALT) 10 MG tablet        Stable and  wants to continue same med's.           Treatment today as per [facility] orders. Asked pt to keep diary of her migraine. Talked about triggers, symptomatic treatment etc.            Follow up check  sooner  If problem.  If frequent HA, consider further evaluation  if needed.     (Z12.31) Breast cancer screening by mammogram  Comment:  Plan: *MA Screening Digital Bilateral           (Z83.49) Family history of thyroid disorder  Comment: dad had hypothyroid - so wanst labs   Plan: TSH with free T4 reflex        Check labs. refill sent.Cares and  treatment discussed.  follow up if problem   Patient expressed understanding and agreement with treatment plan. All patient's questions were answered, will let me know if has more later.  Medications: Rx's: Reviewed the potential side effects/complications of medications prescribed.         COUNSELING:  Reviewed preventive health counseling, as reflected in patient instructions       Regular exercise       Healthy diet/nutrition       Vision screening       Hearing screening       Immunizations    Vaccination utd            Alcohol Use       Osteoporosis prevention/bone health       Colorectal Cancer Screening       (Alise)menopause management       Advance Care Planning        She reports that she has never smoked. She has never used smokeless tobacco.      Saige Blum MD  [facility]

## 2023-05-12 LAB
BKR LAB AP GYN ADEQUACY: NORMAL
BKR LAB AP GYN INTERPRETATION: NORMAL
BKR LAB AP HPV REFLEX: NORMAL
BKR LAB AP PREVIOUS ABNORMAL: NORMAL
PATH REPORT.COMMENTS IMP SPEC: NORMAL
PATH REPORT.COMMENTS IMP SPEC: NORMAL
PATH REPORT.RELEVANT HX SPEC: NORMAL

## 2023-05-16 LAB
HUMAN PAPILLOMA VIRUS 16 DNA: NEGATIVE
HUMAN PAPILLOMA VIRUS 18 DNA: NEGATIVE
HUMAN PAPILLOMA VIRUS FINAL DIAGNOSIS: NORMAL
HUMAN PAPILLOMA VIRUS OTHER HR: NEGATIVE

## 2023-06-07 ENCOUNTER — HOSPITAL ENCOUNTER (OUTPATIENT)
Dept: MAMMOGRAPHY | Facility: CLINIC | Age: 58
Discharge: HOME | End: 2023-06-07
Attending: FAMILY MEDICINE | Admitting: FAMILY MEDICINE
Payer: COMMERCIAL

## 2023-06-07 DIAGNOSIS — Z12.31 VISIT FOR SCREENING MAMMOGRAM: ICD-10-CM

## 2023-06-07 PROCEDURE — 77067 SCR MAMMO BI INCL CAD: CPT

## 2023-08-31 ENCOUNTER — TELEPHONE (OUTPATIENT)
Dept: FAMILY MEDICINE | Facility: CLINIC | Age: 58
End: 2023-08-31

## 2023-08-31 DIAGNOSIS — R74.01 ELEVATED AST (SGOT): Primary | ICD-10-CM

## 2023-08-31 NOTE — TELEPHONE ENCOUNTER
Patient called requesting orders for AST lab recheck. She was advised in May to recheck lab in 4-6 months.    Lab order pended. Please add any PRN labs. Pt will needs a call back to schedule lab only visit once lab order is placed.

## 2023-09-05 NOTE — TELEPHONE ENCOUNTER
Spoke to patient and informed. No questions or concerns.    Marlena CRAFT [facility]

## 2023-09-15 ENCOUNTER — LAB (OUTPATIENT)
Dept: LAB | Facility: CLINIC | Age: 58
End: 2023-09-15
Payer: COMMERCIAL

## 2023-09-15 DIAGNOSIS — R74.01 ELEVATED AST (SGOT): ICD-10-CM

## 2023-09-15 LAB — AST SERPL W P-5'-P-CCNC: 30 U/L (ref 0–45)

## 2023-09-15 PROCEDURE — 36415 COLL VENOUS BLD VENIPUNCTURE: CPT

## 2023-09-15 PROCEDURE — 84450 TRANSFERASE (AST) (SGOT): CPT

## 2023-10-09 ENCOUNTER — TRANSFERRED RECORDS (OUTPATIENT)
Dept: HEALTH INFORMATION MANAGEMENT | Facility: CLINIC | Age: 58
End: 2023-10-09
Payer: COMMERCIAL

## 2023-12-08 ENCOUNTER — APPOINTMENT (OUTPATIENT)
Dept: URBAN - METROPOLITAN AREA CLINIC 256 | Age: 58
Setting detail: DERMATOLOGY
End: 2023-12-11

## 2023-12-08 VITALS — HEIGHT: 64 IN | WEIGHT: 130 LBS

## 2023-12-08 DIAGNOSIS — L60.3 NAIL DYSTROPHY: ICD-10-CM

## 2023-12-08 DIAGNOSIS — D18.0 HEMANGIOMA: ICD-10-CM

## 2023-12-08 DIAGNOSIS — Z71.89 OTHER SPECIFIED COUNSELING: ICD-10-CM

## 2023-12-08 DIAGNOSIS — L57.8 OTHER SKIN CHANGES DUE TO CHRONIC EXPOSURE TO NONIONIZING RADIATION: ICD-10-CM

## 2023-12-08 DIAGNOSIS — Z80.8 FAMILY HISTORY OF MALIGNANT NEOPLASM OF OTHER ORGANS OR SYSTEMS: ICD-10-CM

## 2023-12-08 DIAGNOSIS — D22 MELANOCYTIC NEVI: ICD-10-CM

## 2023-12-08 DIAGNOSIS — L73.8 OTHER SPECIFIED FOLLICULAR DISORDERS: ICD-10-CM

## 2023-12-08 DIAGNOSIS — L82.1 OTHER SEBORRHEIC KERATOSIS: ICD-10-CM

## 2023-12-08 DIAGNOSIS — I78.8 OTHER DISEASES OF CAPILLARIES: ICD-10-CM

## 2023-12-08 PROBLEM — D22.39 MELANOCYTIC NEVI OF OTHER PARTS OF FACE: Status: ACTIVE | Noted: 2023-12-08

## 2023-12-08 PROBLEM — D22.62 MELANOCYTIC NEVI OF LEFT UPPER LIMB, INCLUDING SHOULDER: Status: ACTIVE | Noted: 2023-12-08

## 2023-12-08 PROBLEM — D22.4 MELANOCYTIC NEVI OF SCALP AND NECK: Status: ACTIVE | Noted: 2023-12-08

## 2023-12-08 PROBLEM — D18.01 HEMANGIOMA OF SKIN AND SUBCUTANEOUS TISSUE: Status: ACTIVE | Noted: 2023-12-08

## 2023-12-08 PROBLEM — D22.72 MELANOCYTIC NEVI OF LEFT LOWER LIMB, INCLUDING HIP: Status: ACTIVE | Noted: 2023-12-08

## 2023-12-08 PROBLEM — D22.71 MELANOCYTIC NEVI OF RIGHT LOWER LIMB, INCLUDING HIP: Status: ACTIVE | Noted: 2023-12-08

## 2023-12-08 PROBLEM — D22.61 MELANOCYTIC NEVI OF RIGHT UPPER LIMB, INCLUDING SHOULDER: Status: ACTIVE | Noted: 2023-12-08

## 2023-12-08 PROBLEM — D22.5 MELANOCYTIC NEVI OF TRUNK: Status: ACTIVE | Noted: 2023-12-08

## 2023-12-08 PROCEDURE — OTHER MIPS QUALITY: OTHER

## 2023-12-08 PROCEDURE — 99203 OFFICE O/P NEW LOW 30 MIN: CPT

## 2023-12-08 PROCEDURE — OTHER COUNSELING: OTHER

## 2023-12-08 PROCEDURE — OTHER DEFER: OTHER

## 2023-12-08 ASSESSMENT — LOCATION SIMPLE DESCRIPTION DERM
LOCATION SIMPLE: RIGHT POSTERIOR THIGH
LOCATION SIMPLE: RIGHT FOREHEAD
LOCATION SIMPLE: RIGHT THIGH
LOCATION SIMPLE: LEFT THIGH
LOCATION SIMPLE: LEFT POSTERIOR THIGH
LOCATION SIMPLE: RIGHT UPPER ARM
LOCATION SIMPLE: ABDOMEN
LOCATION SIMPLE: LEFT FOREARM
LOCATION SIMPLE: LEFT CHEEK
LOCATION SIMPLE: LEFT UPPER ARM
LOCATION SIMPLE: POSTERIOR NECK
LOCATION SIMPLE: LEFT LOWER BACK
LOCATION SIMPLE: RIGHT FOREARM
LOCATION SIMPLE: RIGHT CHEEK

## 2023-12-08 ASSESSMENT — LOCATION DETAILED DESCRIPTION DERM
LOCATION DETAILED: LEFT INFERIOR CENTRAL MALAR CHEEK
LOCATION DETAILED: RIGHT DISTAL POSTERIOR THIGH
LOCATION DETAILED: RIGHT INFERIOR MEDIAL FOREHEAD
LOCATION DETAILED: RIGHT MEDIAL TRAPEZIAL NECK
LOCATION DETAILED: RIGHT VENTRAL DISTAL FOREARM
LOCATION DETAILED: RIGHT ANTERIOR DISTAL THIGH
LOCATION DETAILED: LEFT VENTRAL PROXIMAL FOREARM
LOCATION DETAILED: LEFT LATERAL ABDOMEN
LOCATION DETAILED: LEFT DISTAL POSTERIOR UPPER ARM
LOCATION DETAILED: RIGHT DISTAL POSTERIOR UPPER ARM
LOCATION DETAILED: LEFT ANTERIOR PROXIMAL THIGH
LOCATION DETAILED: LEFT CENTRAL MALAR CHEEK
LOCATION DETAILED: LEFT DISTAL POSTERIOR THIGH
LOCATION DETAILED: LEFT INFERIOR MEDIAL MIDBACK
LOCATION DETAILED: RIGHT INFERIOR CENTRAL MALAR CHEEK

## 2023-12-08 ASSESSMENT — LOCATION ZONE DERM
LOCATION ZONE: FACE
LOCATION ZONE: TRUNK
LOCATION ZONE: NECK
LOCATION ZONE: ARM
LOCATION ZONE: LEG

## 2023-12-08 NOTE — HPI: FULL BODY SKIN EXAMINATION
What Is The Reason For Today's Visit?: Full Body Skin Examination
What Is The Reason For Today's Visit? (Being Monitored For X): the development of new lesions
Additional History: The patient presents with main concern of dryness on her face, as well as concern on her big right toe. She seeks for an overall skin evaluation today.

## 2023-12-08 NOTE — PROCEDURE: DEFER
Scheduling Instructions (Optional): Consultation w/ Ellyn Gardner (AP )
X Size Of Lesion In Cm (Optional): 0
Detail Level: Detailed
Introduction Text (Please End With A Colon): The following procedure was deferred:

## 2023-12-26 ENCOUNTER — NURSE TRIAGE (OUTPATIENT)
Dept: FAMILY MEDICINE | Facility: CLINIC | Age: 58
End: 2023-12-26

## 2023-12-26 ENCOUNTER — VIRTUAL VISIT (OUTPATIENT)
Dept: URGENT CARE | Facility: CLINIC | Age: 58
End: 2023-12-26
Payer: COMMERCIAL

## 2023-12-26 DIAGNOSIS — U07.1 INFECTION DUE TO 2019 NOVEL CORONAVIRUS: Primary | ICD-10-CM

## 2023-12-26 PROCEDURE — 99441 PR PHYSICIAN TELEPHONE EVALUATION 5-10 MIN: CPT | Mod: 93

## 2023-12-26 NOTE — TELEPHONE ENCOUNTER
RN COVID TREATMENT VISIT  12/26/23      The patient has been triaged and does not require a higher level of care.    Ana Cristina Ruth  58 year old  Current weight? 133    Has the patient been seen by a primary care provider at an [facility] or [facility] within the past two years? Yes.   Have you been in close proximity to/do you have a known exposure to a person with a confirmed case of influenza? No.     General treatment eligibility:  Date of positive COVID test (PCR or at home)?  12/24/23    Are you or have you been hospitalized for this COVID-19 infection? No.   Have you received monoclonal antibodies or antiviral treatment for COVID-19 since this positive test? No.   Do you have any of the following conditions that place you at risk of being very sick from COVID-19?   - Age 50 years or older  Yes, patient has at least one high risk condition as noted above.     Current COVID symptoms:   - cough  - headache  - congestion or runny nose  Yes. Patient has at least one symptom as selected.     How many days since symptoms started? 5 days or less. Established patient, 12 years or older weighing at least 88.2 lbs, who has symptoms that started in the past 5 days, has not been hospitalized nor received treatment already, and is at risk for being very sick from COVID-19.     Treatment eligibility by RN:  Are you currently pregnant or nursing? No  Do you have a clinically significant hypersensitivity to nirmatrelvir or ritonavir, or toxic epidermal necrolysis (TEN) or Spencer-Ki Syndrome? No  Do you have a history of hepatitis, any hepatic impairment on the Problem List (such as Child-Martínez Class C, cirrhosis, fatty liver disease, alcoholic liver disease), or was the last liver lab (hepatic panel, ALT, AST, ALK Phos, bilirubin) elevated in the past 6 months? YES  Do you have any history of severe renal impairment (eGFR < 30mL/min)? No    Is patient eligible to continue? No, patient  does not meet all eligibility requirements for the RN COVID treatment (as denoted by yes response(s) above). Patient informed they will need a virtual provider visit to assess treatment options.  Patient will be transferred to a  at the end of this call.   Quita Murry RN          Reason for Disposition   COVID-19 diagnosed by positive lab test (e.g., PCR, rapid self-test kit) and mild symptoms (e.g., cough, fever, others) and no complications or SOB    Additional Information   Negative: SEVERE difficulty breathing (e.g., struggling for each breath, speaks in single words)   Negative: Difficult to awaken or acting confused (e.g., disoriented, slurred speech)   Negative: Bluish (or gray) lips or face now   Negative: Shock suspected (e.g., cold/pale/clammy skin, too weak to stand, low BP, rapid pulse)   Negative: Sounds like a life-threatening emergency to the triager   Negative: Diagnosed or suspected COVID-19 and symptoms lasting 3 or more weeks   Negative: COVID-19 exposure and no symptoms   Negative: COVID-19 vaccine reaction suspected (e.g., fever, headache, muscle aches) occurring 1 to 3 days after getting vaccine   Negative: COVID-19 vaccine, questions about   Negative: Lives with someone known to have influenza (flu test positive) and flu-like symptoms (e.g., cough, runny nose, sore throat, SOB; with or without fever)   Negative: Possible COVID-19 symptoms and triager concerned about severity of symptoms or other causes   Negative: COVID-19 and breastfeeding, questions about   Negative: SEVERE or constant chest pain or pressure  (Exception: Mild central chest pain, present only when coughing.)   Negative: MODERATE difficulty breathing (e.g., speaks in phrases, SOB even at rest, pulse 100-120)   Negative: Headache and stiff neck (can't touch chin to chest)   Negative: Oxygen level (e.g., pulse oximetry) 90% or lower   Negative: Chest pain or pressure  (Exception: MILD central chest pain, present  "only when coughing.)   Negative: Drinking very little and dehydration suspected (e.g., no urine > 12 hours, very dry mouth, very lightheaded)   Negative: Patient sounds very sick or weak to the triager   Negative: MILD difficulty breathing (e.g., minimal/no SOB at rest, SOB with walking, pulse <100)   Negative: Fever > 103 F (39.4 C)   Negative: Fever > 101 F (38.3 C) and over 60 years of age   Negative: Fever > 100.0 F (37.8 C) and bedridden (e.g., CVA, chronic illness, recovering from surgery)   Negative: HIGH RISK patient (e.g., weak immune system, age > 64 years, obesity with BMI of 30 or higher, pregnant, chronic lung disease or other chronic medical condition) and COVID symptoms (e.g., cough, fever)  (Exceptions: Already seen by doctor or NP/PA and no new or worsening symptoms.)   Negative: HIGH RISK patient and influenza is widespread in the community and ONE OR MORE respiratory symptoms: cough, sore throat, runny or stuffy nose   Negative: HIGH RISK patient and influenza exposure within the last 7 days and ONE OR MORE respiratory symptoms: cough, sore throat, runny or stuffy nose   Negative: Oxygen level (e.g., pulse oximetry) 91 to 94%   Negative: COVID-19 infection suspected by caller or triager and mild symptoms (cough, fever, or others) and negative COVID-19 rapid test   Negative: Fever present > 3 days (72 hours)   Negative: Fever returns after gone for over 24 hours and symptoms worse or not improved   Negative: Continuous (nonstop) coughing interferes with work or school and no improvement using cough treatment per Care Advice   Negative: Cough present > 3 weeks   Negative: COVID-19 diagnosed by positive lab test (e.g., PCR, rapid self-test kit) and NO symptoms (e.g., cough, fever, others)    Answer Assessment - Initial Assessment Questions  1. COVID-19 DIAGNOSIS: \"How do you know that you have COVID?\" (e.g., positive lab test or self-test, diagnosed by doctor or NP/PA, symptoms after exposure).      " "Home test  2. COVID-19 EXPOSURE: \"Was there any known exposure to COVID before the symptoms began?\" CDC Definition of close contact: within 6 feet (2 meters) for a total of 15 minutes or more over a 24-hour period.       I don't know  3. ONSET: \"When did the COVID-19 symptoms start?\"       12/21/23  4. WORST SYMPTOM: \"What is your worst symptom?\" (e.g., cough, fever, shortness of breath, muscle aches)      Sinus pressure  5. COUGH: \"Do you have a cough?\" If Yes, ask: \"How bad is the cough?\"        Yes  6. FEVER: \"Do you have a fever?\" If Yes, ask: \"What is your temperature, how was it measured, and when did it start?\"      No  7. RESPIRATORY STATUS: \"Describe your breathing?\" (e.g., normal; shortness of breath, wheezing, unable to speak)       no  8. BETTER-SAME-WORSE: \"Are you getting better, staying the same or getting worse compared to yesterday?\"  If getting worse, ask, \"In what way?\"      better  9. OTHER SYMPTOMS: \"Do you have any other symptoms?\"  (e.g., chills, fatigue, headache, loss of smell or taste, muscle pain, sore throat)      Sinus pressure, headache, nasal drip, chills  10. HIGH RISK DISEASE: \"Do you have any chronic medical problems?\" (e.g., asthma, heart or lung disease, weak immune system, obesity, etc.)        no  11. VACCINE: \"Have you had the COVID-19 vaccine?\" If Yes, ask: \"Which one, how many shots, when did you get it?\"        yes  12. PREGNANCY: \"Is there any chance you are pregnant?\" \"When was your last menstrual period?\"        no  13. O2 SATURATION MONITOR:  \"Do you use an oxygen saturation monitor (pulse oximeter) at home?\" If Yes, ask \"What is your reading (oxygen level) today?\" \"What is your usual oxygen saturation reading?\" (e.g., 95%)        no    Protocols used: Coronavirus (COVID-19) Diagnosed or [id number]    "

## 2023-12-26 NOTE — PROGRESS NOTES
"Ana Cristina is a 58 year old who is being evaluated via a billable telephone visit.      What phone number would you like to be contacted at?   How would you like to obtain your AVS? Maninder    Distant Location (provider location):  Off-site    Assessment & Plan     Infection due to 2019 novel coronavirus    - nirmatrelvir and ritonavir (PAXLOVID) 300 mg/100 mg therapy pack; Take 3 tablets by mouth 2 times daily for 5 days (Take 2 Nirmatrelvir tablets and 1 Ritonavir tablet twice daily for 5 days)       COVID-19 positive patient.  Encounter for consideration of medication intervention. Patient does qualify for a prescription. Full discussion with patient including medication options, risks and benefits. Potential drug interactions reviewed with patient.     Treatment Planned  Paxlovid sent to [facility] in     Temporary change to home medications: holding atorvastatin for 8 days    Estimated body mass index is 23.25 kg/m  as calculated from the following:    Height as of 5/10/23: 1.619 m (5' 3.75\").    Weight as of 5/10/23: 61 kg (134 lb 6.4 oz).  GFR Estimate   Date Value Ref Range Status   05/10/2023 70 >60 mL/min/1.73m2 Final     Comment:     eGFR calculated using 2021 CKD-EPI equation.   02/22/2021 77 >60 mL/min/[1.73_m2] Final     Comment:     Non  GFR Calc  Starting 12/18/2018, serum creatinine based estimated GFR (eGFR) will be   calculated using the Chronic Kidney Disease Epidemiology Collaboration   (CKD-EPI) equation.       Lab Results   Component Value Date    [id number] Negative 11/12/2022       Return in about 1 week (around 1/2/2024), or if symptoms worsen or fail to improve.    Virtual Urgent Care  [facility] VIRTUAL URGENT CARE    Subjective   Ana Cristina is a 58 year old, presenting for the following health issues:  No chief complaint on file.      HPI       COVID-19 Symptom Review  How many days ago did these symptoms start? 3 days ago    Are any of the following symptoms significant " for you?  New or worsening difficulty breathing? No  Worsening cough? Yes, it's a dry cough.   Fever or chills? Yes, I felt feverish or had chills.  Headache: YES  Sore throat: No  Chest pain: No  Diarrhea: No  Body aches? YES    What treatments has patient tried? Acetaminophen   Does patient live in a nursing home, group home, or shelter? No  Does patient have a way to get food/medications during quarantined? Yes, I have a friend or family member who can help me.              Review of Systems   Constitutional, HEENT, cardiovascular, pulmonary, gi and gu systems are negative, except as otherwise noted.      Objective           Vitals:  No vitals were obtained today due to virtual visit.    Physical Exam   healthy, alert, and no distress  PSYCH: Alert and oriented times 3; coherent speech, normal   rate and volume, able to articulate logical thoughts, able   to abstract reason, no tangential thoughts, no hallucinations   or delusions  Her affect is normal  RESP: No cough, no audible wheezing, able to talk in full sentences  Remainder of exam unable to be completed due to telephone visits                Phone call duration: 6 minutes

## 2023-12-26 NOTE — PATIENT INSTRUCTIONS
For informational purposes only. Not to replace the advice of your health care provider. Copyright   2022 [facility]. All rights reserved. Clinically reviewed by Lorraine Stoddard, PharmD, BCACP. [employer] [id number] - REV 06/23.  COVID-19 Outpatient Treatments  Your care team can help you find the best treatments for COVID-19. Talk to a health care provider or refer to the FDA medicine fact sheets below.  Paxlovid (nirmatrelvir and ritonavir): https://www.paxlovid.[employer]ur[employer]/resources  Molnupiravir (Lagevrio): https://www.fda.gov/media/753864/download  Important: We can only prescribe Paxlovid or Molnupiravir when it can be started within 5 days of first having symptoms.  Paxlovid (nimatrelvir and ritonavir)  How it works  Two medicines (nirmatrelvir and ritonavir) are taken together. They stop the virus from growing. Less amount of virus is easier for your body to fight.  Benefits  Lowers risk of a hospital stay or death from COVID-19.  How to take  Medicine comes in a daily container with both medicine tablets. Take by mouth twice daily (once in the morning, once at night) for 5 days.  The number of tablets to take varies by patient.  Don't chew or break capsules. Swallow whole.  When to take  Take it as soon as possible and within 5 days of your first symptoms.  Who can take it  Patients must be 12 years or older weigh at least 88 pounds. Paxlovid is the preferred treatment for pregnant patients.  Possible side effects  Can cause altered sense of taste, diarrhea (loose, watery stools), high blood pressure, muscle aches.  Medicine conflicts  Some medicines may conflict with Paxlovid and may cause serious side effects.  Tell your care team about all the medicines you take, including prescription and over-the-counter medicines, vitamins, and herbal supplements.  Your care team will review your medicines to make sure that you can safely take Paxlovid.  Cautions  Paxlovid is not advised for patients with severe  kidney or liver disease. If you have kidney or liver problems, the dose may need to be changed.  If you're pregnant or breastfeeding, talk to your care team about your options.  If you take hormonal birth control (such as the Pill), then you or your partner should also use a non-hormonal form of birth control (such as a condom). Keep doing this for 1 menstrual cycle after your last dose of Paxlovid.  Molnupiravir (lagevrio)  How it works  Stops the virus from growing. Less amount of virus is easier for your body to fight.  Benefits  Lowers risk of a hospital stay or death from COVID-19.  How to take  Take 4 capsules by mouth every 12 hours (4 in the morning and 4 at night) for 5 days. Don't chew or break capsules. Swallow whole.  When to take  Take as soon as possible and within 5 days of your first symptoms.  Who can take it  Patients must be 18 years or older.   Possible side effects  Diarrhea (loose, watery stools), nausea (feeling sick to your stomach), dizziness, headaches.  Medicine conflicts  Right now, there are no known conflicts with other drugs. But tell your care team about all medicines you take.  Cautions  This medicine is not advised for patients who are pregnant.  If you are someone who could become pregnant, use trusted birth control until 4 days after your last dose of molnupiravir.  If your partner could become pregnant, you should use trusted birth control until 3 months after your last dose of molnupiravir.      Coping with Life After COVID-19  Being in the hospital because of COVID-19 is scary. Going home can be scary, too. You may face changes to your life, the way you work or what you can eat. It s hard to adjust to change, and it s normal to feel afraid, frustrated or even angry. These feelings usually go away over time. If your feelings don t start to get better, it s called  adjustment disorder.      What signs should I look out for?  Adjustment disorder can happen to anyone in a time of  stress. It makes it hard to cope with daily life. You may feel lonely or fight with loved ones, even if you re glad to be home. Watch for these signs:  Fear or worry  Hard time focusing  Sadness or anger  Trouble talking to family or friends  Feeling like you don t fit in or isolating yourself  Problems with sleep   Drinking alcohol or taking drugs to cope    What can I do?  You can help yourself get better. Feeling you have control helps you move forward. You may wonder if you ll be able to do things you did before. Be patient. Do your best to make the most of every day. Try to build relationships, be as active as you can, eat right and keep a sense of humor. Avoid smoking and drinking too much alcohol. Call your family doctor or clinic if you re not sure what to do. They can guide you to care or other services.    When should I get help?  Think about getting counseling if your sadness or frustration gets worse. Together with a trained counselor, you can talk about your problems adjusting to life after your hospital stay. You can come up with new ways to handle changes that give you more control. Your family doctor or care team can help you find a counselor.     Get help if you re thinking about hurting yourself. If you need help right away, call 911 or go to the nearest emergency room. You can also try the Crisis Text Line.    Crisis Text Line: 020-876 (http://www.crisistextline.org)  The Crisis Text Line serves anyone, in any crisis. It gives free, 24/7 support. Here's how it works:  Text HOME to 903227 from anywhere in the [place], anytime, about any type of crisis.  A live, trained Crisis Counselor will text you back quickly.  The volunteer Crisis Counselor can help you move from a  hot moment  to a  cool moment.  They can also help you work out a safety plan.

## 2024-02-15 ENCOUNTER — OFFICE VISIT (OUTPATIENT)
Dept: FAMILY MEDICINE | Facility: CLINIC | Age: 59
End: 2024-02-15
Payer: COMMERCIAL

## 2024-02-15 VITALS
DIASTOLIC BLOOD PRESSURE: 72 MMHG | TEMPERATURE: 97 F | WEIGHT: 133 LBS | RESPIRATION RATE: 17 BRPM | HEART RATE: 65 BPM | HEIGHT: 63 IN | SYSTOLIC BLOOD PRESSURE: 122 MMHG | OXYGEN SATURATION: 100 % | BODY MASS INDEX: 23.57 KG/M2

## 2024-02-15 DIAGNOSIS — D25.9 UTERINE LEIOMYOMA, UNSPECIFIED LOCATION: ICD-10-CM

## 2024-02-15 DIAGNOSIS — R10.2 PELVIC PAIN: Primary | ICD-10-CM

## 2024-02-15 LAB
ALBUMIN UR-MCNC: NEGATIVE MG/DL
APPEARANCE UR: CLEAR
BASOPHILS # BLD AUTO: 0 10E3/UL (ref 0–0.2)
BASOPHILS NFR BLD AUTO: 1 %
BILIRUB UR QL STRIP: NEGATIVE
CLUE CELLS: ABNORMAL
COLOR UR AUTO: YELLOW
EOSINOPHIL # BLD AUTO: 0.1 10E3/UL (ref 0–0.7)
EOSINOPHIL NFR BLD AUTO: 1 %
ERYTHROCYTE [DISTWIDTH] IN BLOOD BY AUTOMATED COUNT: 11.7 % (ref 10–15)
GLUCOSE UR STRIP-MCNC: NEGATIVE MG/DL
HCT VFR BLD AUTO: 39.8 % (ref 35–47)
HGB BLD-MCNC: 13.1 G/DL (ref 11.7–15.7)
HGB UR QL STRIP: NEGATIVE
IMM GRANULOCYTES # BLD: 0 10E3/UL
IMM GRANULOCYTES NFR BLD: 0 %
KETONES UR STRIP-MCNC: NEGATIVE MG/DL
LEUKOCYTE ESTERASE UR QL STRIP: ABNORMAL
LYMPHOCYTES # BLD AUTO: 1.7 10E3/UL (ref 0.8–5.3)
LYMPHOCYTES NFR BLD AUTO: 29 %
MCH RBC QN AUTO: 30.9 PG (ref 26.5–33)
MCHC RBC AUTO-ENTMCNC: 32.9 G/DL (ref 31.5–36.5)
MCV RBC AUTO: 94 FL (ref 78–100)
MONOCYTES # BLD AUTO: 0.4 10E3/UL (ref 0–1.3)
MONOCYTES NFR BLD AUTO: 8 %
NEUTROPHILS # BLD AUTO: 3.5 10E3/UL (ref 1.6–8.3)
NEUTROPHILS NFR BLD AUTO: 61 %
NITRATE UR QL: NEGATIVE
PH UR STRIP: 6 [PH] (ref 5–7)
PLATELET # BLD AUTO: 263 10E3/UL (ref 150–450)
RBC # BLD AUTO: 4.24 10E6/UL (ref 3.8–5.2)
RBC #/AREA URNS AUTO: NORMAL /HPF
SP GR UR STRIP: 1.01 (ref 1–1.03)
TRICHOMONAS, WET PREP: ABNORMAL
UROBILINOGEN UR STRIP-ACNC: 0.2 E.U./DL
WBC # BLD AUTO: 5.7 10E3/UL (ref 4–11)
WBC #/AREA URNS AUTO: NORMAL /HPF
WBC'S/HIGH POWER FIELD, WET PREP: ABNORMAL
YEAST, WET PREP: ABNORMAL

## 2024-02-15 PROCEDURE — 85025 COMPLETE CBC W/AUTO DIFF WBC: CPT | Performed by: PHYSICIAN ASSISTANT

## 2024-02-15 PROCEDURE — 87210 SMEAR WET MOUNT SALINE/INK: CPT | Performed by: PHYSICIAN ASSISTANT

## 2024-02-15 PROCEDURE — 36415 COLL VENOUS BLD VENIPUNCTURE: CPT | Performed by: PHYSICIAN ASSISTANT

## 2024-02-15 PROCEDURE — 81001 URINALYSIS AUTO W/SCOPE: CPT | Performed by: PHYSICIAN ASSISTANT

## 2024-02-15 PROCEDURE — 80048 BASIC METABOLIC PNL TOTAL CA: CPT | Performed by: PHYSICIAN ASSISTANT

## 2024-02-15 PROCEDURE — 99214 OFFICE O/P EST MOD 30 MIN: CPT | Performed by: PHYSICIAN ASSISTANT

## 2024-02-15 ASSESSMENT — PAIN SCALES - GENERAL: PAINLEVEL: MILD PAIN (2)

## 2024-02-15 NOTE — PROGRESS NOTES
Assessment & Plan     Pelvic pain  1 week of intermittent left lower pelvic pain. Labs as noted below to rule out infection although her symptoms do not seem overtly concerning for an infectious cause. Does have history of small uterine fibroid - recheck pelvic US.   - CBC with platelets and differential  - Basic metabolic panel  (Ca, Cl, CO2, Creat, Gluc, K, Na, BUN)  - UA with Microscopic reflex to Culture - lab collect  - Wet prep - lab collect  - US Pelvic Complete with Transvaginal    Uterine leiomyoma, unspecified location  - US Pelvic Complete with Transvaginal    Follow up to be determined once results return. Close follow up with PCP if above workup unremarkable and symptoms not improving     Mellisa Love PA-C on 2/15/2024 at 1:24 PM        Subjective   Ana Cristina is a 58 year old, presenting for the following health issues:  Abdominal Pain        2/15/2024     1:20 PM   Additional Questions   Roomed by Rubina DAMIAN     History of Present Illness       Reason for visit:  Abdominal pain  Symptom onset:  1-2 weeks ago  Symptoms include:  Pain in my lower left abdomen/pelivic area that comes and goes.  can radiate to right side of pelvis  Symptom intensity:  Mild  Symptom progression:  Worsening  Had these symptoms before:  No  What makes it worse:  Havent been able to identify anything  What makes it better:  Haven t been able to identify anything    She eats 2-3 servings of fruits and vegetables daily.She consumes 0 sweetened beverage(s) daily.She exercises with enough effort to increase her heart rate 30 to 60 minutes per day.  She exercises with enough effort to increase her heart rate 7 days per week.   She is taking medications regularly.    1 week of left low pelvic pain intermittently, sometimes radiates to right side  A little more constant the last few days  No vaginal bleeding   No vaginal discharge, itching, irritation  No dysuria, hematuria, frequency, urgency   No blood in stool or black  "tarry stool   No weight loss   Eating and drinking normally   Currently no constipation but history of this in the past  Diarrhea 2 weeks ago after a meal x24 hours then resolved, no blood    Known uterine fibroid, last US 7/2022 showed 1 cm right uterine subserosal fibroid   Has done pelvic floor PT   Saw colorectal for constipation and associated rectal bleeding - advised miralax daily and colonoscopy   Colonoscopy 11/2022 was normal, repeat 10 years  Pap negative cotesting 5-2023    Wt Readings from Last 5 Encounters:   02/15/24 60.3 kg (133 lb)   05/10/23 61 kg (134 lb 6.4 oz)   10/19/22 62 kg (136 lb 11.2 oz)   07/12/22 60.8 kg (134 lb)   04/04/22 60.3 kg (133 lb)     Review of Systems  Constitutional, HEENT, cardiovascular, pulmonary, gi and gu systems are negative, except as otherwise noted.      Objective    /72 (BP Location: Right arm, Patient Position: Sitting, Cuff Size: Adult Regular)   Pulse 65   Temp 97  F (36.1  C) (Tympanic)   Resp 17   Ht 1.607 m (5' 3.27\")   Wt 60.3 kg (133 lb)   LMP 11/25/2007   SpO2 100%   BMI 23.36 kg/m    Body mass index is 23.36 kg/m .  Physical Exam   GENERAL: alert and no distress  ABDOMEN: soft, currently nontender but reported area of pain seems to be lower left pelvis, no hepatosplenomegaly, no masses and bowel sounds normal  MS: full ROM left hip without crepitus or pain   SKIN: no suspicious lesions or rashes on exposed skin   NEURO: Normal strength and tone, mentation intact and speech normal  BACK: no CVA tenderness  PSYCH: mentation appears normal, affect normal/bright        Signed Electronically by: Mellisa Love PA-C on 2/15/2024 at 1:25 PM    "

## 2024-02-16 LAB
ANION GAP SERPL CALCULATED.3IONS-SCNC: 10 MMOL/L (ref 7–15)
BUN SERPL-MCNC: 11.9 MG/DL (ref 8–23)
CALCIUM SERPL-MCNC: 9.8 MG/DL (ref 8.8–10.2)
CHLORIDE SERPL-SCNC: 102 MMOL/L (ref 98–107)
CREAT SERPL-MCNC: 0.89 MG/DL (ref 0.51–0.95)
DEPRECATED HCO3 PLAS-SCNC: 28 MMOL/L (ref 22–29)
EGFRCR SERPLBLD CKD-EPI 2021: 74 ML/MIN/1.73M2
GLUCOSE SERPL-MCNC: 80 MG/DL (ref 70–99)
POTASSIUM SERPL-SCNC: 4.3 MMOL/L (ref 3.4–5.3)
SODIUM SERPL-SCNC: 140 MMOL/L (ref 135–145)

## 2024-02-16 NOTE — RESULT ENCOUNTER NOTE
Ana Cristina,    I have reviewed your lab results below:    - urine test does not show any sign of infection or blood   - vaginal swab is normal  - blood counts are normal - normal hemoglobin/red blood cells (no anemia), normal white blood cells (infection fighting cells), normal platelets (affect ability to clot normally)    - electrolytes, blood sugar and kidney function normal      Once I see results from your ultrasound I will reach out. In the meantime, please let me know if you have any further questions.    Take care,  Mellisa Love PA-C on 2/16/2024 at 8:03 AM

## 2024-02-19 ENCOUNTER — ANCILLARY PROCEDURE (OUTPATIENT)
Dept: ULTRASOUND IMAGING | Facility: CLINIC | Age: 59
End: 2024-02-19
Attending: PHYSICIAN ASSISTANT
Payer: COMMERCIAL

## 2024-02-19 DIAGNOSIS — R10.2 PELVIC PAIN: ICD-10-CM

## 2024-02-19 DIAGNOSIS — D25.9 UTERINE LEIOMYOMA, UNSPECIFIED LOCATION: ICD-10-CM

## 2024-02-19 PROCEDURE — 76830 TRANSVAGINAL US NON-OB: CPT

## 2024-02-19 PROCEDURE — 76856 US EXAM PELVIC COMPLETE: CPT

## 2024-02-19 NOTE — RESULT ENCOUNTER NOTE
Ana Cristina,    Good news - your ultrasound shows a stable uterine fibroid, unchanged from prior. Otherwise the ultrasound is normal. If your symptoms are continuing, please follow up with Dr. Blum for further evaluation. Please let me know if you have any further questions.    Take care,  Mellisa Love PA-C on 2/19/2024 at 8:28 AM

## 2024-02-27 ENCOUNTER — OFFICE VISIT (OUTPATIENT)
Dept: FAMILY MEDICINE | Facility: CLINIC | Age: 59
End: 2024-02-27
Payer: COMMERCIAL

## 2024-02-27 VITALS
TEMPERATURE: 96.8 F | WEIGHT: 135 LBS | BODY MASS INDEX: 23.92 KG/M2 | DIASTOLIC BLOOD PRESSURE: 76 MMHG | RESPIRATION RATE: 16 BRPM | SYSTOLIC BLOOD PRESSURE: 128 MMHG | OXYGEN SATURATION: 96 % | HEIGHT: 63 IN | HEART RATE: 66 BPM

## 2024-02-27 DIAGNOSIS — M99.05 PELVIC SOMATIC DYSFUNCTION: ICD-10-CM

## 2024-02-27 DIAGNOSIS — R10.32 GROIN PAIN, LEFT: Primary | ICD-10-CM

## 2024-02-27 PROCEDURE — 99214 OFFICE O/P EST MOD 30 MIN: CPT | Performed by: FAMILY MEDICINE

## 2024-02-27 RX ORDER — METHYLCELLULOSE 4000CPS 30 %
POWDER (GRAM) MISCELLANEOUS
COMMUNITY
Start: 2022-10-20

## 2024-02-27 ASSESSMENT — PAIN SCALES - GENERAL: PAINLEVEL: MILD PAIN (2)

## 2024-02-27 NOTE — PROGRESS NOTES
Assessment & Plan     (R10.32) Groin pain, left  (primary encounter diagnosis)  Comment: feels like muscular pain, left groin has mild back ache too   Plan: Physical Therapy  Referral            (M99.05) Pelvic somatic dysfunction  Comment: that has improved after PT  and likely not the cause of current pain  Plan: reviewed recent labs/ us report and it is normal              discussed cares and symptomatic treatment including  adequate pain control,  stretches etc.    she will do follow up if no improvement or problem. Consider further evaluation  if needed.     .Cares and  treatment discussed. Declined any pain med's , as she does not think she needs any.  follow up if problem   Patient expressed understanding and agreement with treatment plan. All patient's questions were answered, will let me know if has more later.           Froylan De La Paz is a 58 year old, presenting for the following health issues:  Musculoskeletal Problem    Musculoskeletal Problem  Follow up ongoing left sided pain , concerned with possible related to pelvic pain     Had issue with pelvic pain previously related to constipation etc , and has done PT for pelvic floor   ago and has made a difference. Not a constipated as much , and no new GI sx.   But this pain , she noted on and off   for past couple of weeks and this  feels different then the previous pain. She was seen for this few weeks ago and now here for follow up check as her symptoms are persisting and not resolved yet   Has pain in lower left side,  close to  left groin and feels localized  to that area and may be radiate across a little to just same area , but not constant pain, and sometimes may go away completely.not really needing any pain med's as it is not bad and it comes and goes but it is still persisting so she is concerned   does not bother during night and she sleeps good and more noticeable during day time on and off when she is physically active.    unsure f posture cause any pain,  but she stands on her desk and sometimes she is switching her weight to one side or the other   Also has been running and sometimes she has felt a little pain on inner side of left thigh but nothing constant.  no urinary sx , no gi  sx . No spotting bleeding etc. Eating ok , no fatigue and getting a good night sleep .     She saw another provider few weeks ago and thinking that if it is possible pelvic pain, she had pelvic ultrasound and it was normal. She also tested including blood test , vaginal swab and urine test .     It feels like same pain but still there , aleve not helping enough. Pain is not worse but not going away and keep coming       2/27/2024    12:50 PM   Additional Questions   Roomed by Lali             Review of Systems  Constitutional, HEENT, cardiovascular, pulmonary, GI, , musculoskeletal, neuro, skin, endocrine and psych systems are negative, except as otherwise noted.      Objective    LMP 11/25/2007   There is no height or weight on file to calculate BMI.  Physical Exam   GENERAL: alert and no distress  ABDOMEN: soft, nontender, no hepatosplenomegaly, no masses and bowel sounds normal.   No palpable or obvious hernia at this time  but has discomfort close to medial suprapubic area on left    MS: back with normal range of motion, but has mild paraspinal muscle tenderness bilaterally.  hip with good ROM in both but has slight stiffnes and aggravate pain with left hip  range of motion  , groin with no swelling erythema but has mild  tenderness localized to  medial end of groin close to suprapubic area  but no guarding or rigidity   SKIN: no suspicious lesions or rashes  NEURO: Normal strength and tone, mentation intact and speech normal  PSYCH: mentation appears normal, affect normal/bright    Diagnostic- labs/ us reviewed form last visit     Signed Electronically by: Saige Blum MD

## 2024-02-29 ASSESSMENT — ACTIVITIES OF DAILY LIVING (ADL)
GETTING_INTO_AND_OUT_OF_A_BATHTUB: NO DIFFICULTY AT ALL
STEPPING UP AND DOWN CURBS: NO DIFFICULTY AT ALL
ADL_HIGHEST_POTENTIAL_SCORE: 68
ABILITY_TO_PERFORM_ACTIVITY_WITH_YOUR_NORMAL_TECHNIQUE: NO DIFFICULTY AT ALL
SPORTS_TOTAL_ITEM_SCORE: 0
SITTING FOR 15 MINUTES: NO DIFFICULTY AT ALL
TWISTING/PIVOTING_ON_INVOLVED_LEG: NO DIFFICULTY AT ALL
ADL_TOTAL_ITEM_SCORE: 0
WALKING_UP_STEEP_HILLS: NO DIFFICULTY AT ALL
SPORTS_COUNT: 9
LANDING: NO DIFFICULTY AT ALL
HOS_ADL_HIGHEST_POTENTIAL_SCORE: 68
SPORTS_SCORE(%): 0
LOW_IMPACT_ACTIVITIES_LIKE_FAST_WALKING: NO DIFFICULTY AT ALL
TWISTING/PIVOTING ON INVOLVED LEG: NO DIFFICULTY AT ALL
HEAVY_WORK: NO DIFFICULTY AT ALL
ABILITY_TO_PARTICIPATE_IN_YOUR_DESIRED_SPORT_AS_LONG_AS_YOU_WOULD_LIKE: SLIGHT DIFFICULTY
JUMPING: NO DIFFICULTY AT ALL
WALKING_FOR_APPROXIMATELY_10_MINUTES: NO DIFFICULTY AT ALL
STANDING FOR 15 MINUTES: NO DIFFICULTY AT ALL
WALKING_DOWN_STEEP_HILLS: NO DIFFICULTY AT ALL
WALKING_INITIALLY: NO DIFFICULTY AT ALL
SWINGING_OBJECTS_LIKE_A_GOLF_CLUB: NO DIFFICULTY AT ALL
HOS_ADL_ITEM_SCORE_TOTAL: 68
SITTING_FOR_15_MINUTES: NO DIFFICULTY AT ALL
LIGHT_TO_MODERATE_WORK: NO DIFFICULTY AT ALL
WALKING_APPROXIMATELY_10_MINUTES: NO DIFFICULTY AT ALL
DEEP_SQUATTING: NO DIFFICULTY AT ALL
ROLLING OVER IN BED: NO DIFFICULTY AT ALL
WALKING_15_MINUTES_OR_GREATER: NO DIFFICULTY AT ALL
GOING DOWN 1 FLIGHT OF STAIRS: NO DIFFICULTY AT ALL
DEEP SQUATTING: NO DIFFICULTY AT ALL
ROLLING_OVER_IN_BED: NO DIFFICULTY AT ALL
WALKING_DOWN_STEEP_HILLS: NO DIFFICULTY AT ALL
STARTING_AND_STOPPING_QUICKLY: NO DIFFICULTY AT ALL
LIGHT_TO_MODERATE_WORK: NO DIFFICULTY AT ALL
GOING_UP_1_FLIGHT_OF_STAIRS: NO DIFFICULTY AT ALL
RUNNING_ONE_MILE: NO DIFFICULTY AT ALL
WALKING_INITIALLY: NO DIFFICULTY AT ALL
GETTING_INTO_AND_OUT_OF_A_BATHTUB: NO DIFFICULTY AT ALL
ADL_COUNT: 17
GOING_DOWN_1_FLIGHT_OF_STAIRS: NO DIFFICULTY AT ALL
RECREATIONAL ACTIVITIES: NO DIFFICULTY AT ALL
GETTING INTO AND OUT OF AN AVERAGE CAR: NO DIFFICULTY AT ALL
STANDING_FOR_15_MINUTES: NO DIFFICULTY AT ALL
CUTTING/LATERAL_MOVEMENTS: NO DIFFICULTY AT ALL
HOS_ADL_SCORE(%): 100
GETTING_INTO_AND_OUT_OF_AN_AVERAGE_CAR: NO DIFFICULTY AT ALL
HOW_WOULD_YOU_RATE_YOUR_CURRENT_LEVEL_OF_FUNCTION_DURING_YOUR_USUAL_ACTIVITIES_OF_DAILY_LIVING_FROM_0_TO_100_WITH_100_BEING_YOUR_LEVEL_OF_FUNCTION_PRIOR_TO_YOUR_HIP_PROBLEM_AND_0_BEING_THE_INABILITY_TO_PERFORM_ANY_OF_YOUR_USUAL_DAILY_ACTIVITIES?: 98
ADL_SCORE(%): 0
SPORTS_HIGHEST_POTENTIAL_SCORE: 36
PUTTING ON SOCKS AND SHOES: NO DIFFICULTY AT ALL
PLEASE_INDICATE_YOR_PRIMARY_REASON_FOR_REFERRAL_TO_THERAPY:: HIP
PUTTING_ON_SOCKS_AND_SHOES: NO DIFFICULTY AT ALL
GOING UP 1 FLIGHT OF STAIRS: NO DIFFICULTY AT ALL
HOW_WOULD_YOU_RATE_YOUR_CURRENT_LEVEL_OF_FUNCTION_DURING_YOUR_USUAL_ACTIVITIES_OF_DAILY_LIVING_FROM_0_TO_100_WITH_100_BEING_YOUR_LEVEL_OF_FUNCTION_PRIOR_TO_YOUR_HIP_PROBLEM_AND_0_BEING_THE_INABILITY_TO_PERFORM_ANY_OF_YOUR_USUAL_DAILY_ACTIVITIES?: 98
HOW_WOULD_YOU_RATE_YOUR_CURRENT_LEVEL_OF_FUNCTION?: NEARLY NORMAL
STEPPING_UP_AND_DOWN_CURBS: NO DIFFICULTY AT ALL
HOW_WOULD_YOU_RATE_YOUR_CURRENT_LEVEL_OF_FUNCTION_DURING_YOUR_SPORTS_RELATED_ACTIVITIES_FROM_0_TO_100_WITH_100_BEING_YOUR_LEVEL_OF_FUNCTION_PRIOR_TO_YOUR_HIP_PROBLEM_AND_0_BEING_THE_INABILITY_TO_PERFORM_ANY_OF_YOUR_USUAL_DAILY_ACTIVITIES?: 97
WALKING_15_MINUTES_OR_GREATER: NO DIFFICULTY AT ALL
HEAVY_WORK: NO DIFFICULTY AT ALL
WALKING_UP_STEEP_HILLS: NO DIFFICULTY AT ALL
RECREATIONAL_ACTIVITIES: NO DIFFICULTY AT ALL

## 2024-03-06 ENCOUNTER — THERAPY VISIT (OUTPATIENT)
Dept: PHYSICAL THERAPY | Facility: CLINIC | Age: 59
End: 2024-03-06
Attending: FAMILY MEDICINE
Payer: COMMERCIAL

## 2024-03-06 DIAGNOSIS — R10.32 GROIN PAIN, LEFT: ICD-10-CM

## 2024-03-06 PROCEDURE — 97161 PT EVAL LOW COMPLEX 20 MIN: CPT | Mod: GP | Performed by: PHYSICAL THERAPIST

## 2024-03-06 PROCEDURE — 97530 THERAPEUTIC ACTIVITIES: CPT | Mod: GP | Performed by: PHYSICAL THERAPIST

## 2024-03-06 PROCEDURE — 97110 THERAPEUTIC EXERCISES: CPT | Mod: GP | Performed by: PHYSICAL THERAPIST

## 2024-03-06 NOTE — PROGRESS NOTES
PHYSICAL THERAPY EVALUATION  Type of Visit: Evaluation    See electronic medical record for Abuse and Falls Screening details.    Subjective       Presenting condition or subjective complaint: Pelvic/groin pain.  Pt reports feeling L groin pain 2/9/2024.  Reports it started after a BM but not sure there is a correlation.  Points to pain in L area.  Pain is intermittent, would come and go.  Does not get bothered by sleep.  Just last Friday, pain mysteriously has improve and felt better.  Now she is feeling it come on a little bit.  She denies recalling anything that may have caused pain.  Does have a standing desk, notes she does wt bear on L side maybe a little more but unsure if that is the cause/related.  Denies pain with bowel movements.  Was seen in past for pelvic floor therapy and reports improvements with constipation.    Date of onset: 02/09/24    Relevant medical history: Migraines or headaches   Dates & types of surgery: 7/1999 c section    Prior diagnostic imaging/testing results:     pelvic US unremarkable on 2/19/2024  Prior therapy history for the same diagnosis, illness or injury: No      Prior Level of Function  Transfers:   Ambulation:   ADL:   IADL:     Living Environment  Social support: With a significant other or spouse   Type of home: House   Stairs to enter the home: Yes 3 Is there a railing: Yes   Ramp: No   Stairs inside the home: Yes 25 Is there a railing: Yes   Help at home: None  Equipment owned:       Employment: Yes   Hobbies/Interests: Exercise, reading    Patient goals for therapy: Nothing. Would like to resolve the pain    Pain assessment:      Objective   HIP EVALUATION  PAIN: Pain Level at Rest: 0/10  Pain Level with Use: 2/10  Pain Location: L groin  Pain Quality: Nagging  Pain Frequency: intermittent  Pain is Worst: variable  Pain is Exacerbated By: unsure of what makes it worse, variable, no pattern  Pain is Relieved By: unsure  Pain Progression: Improved  INTEGUMENTARY  (edema, incisions): WNL  POSTURE: WNL  GAIT:   Weightbearing Status: WBAT  Assistive Device(s): None  Gait Deviations: WNL  BALANCE/PROPRIOCEPTION: Single Leg Stance Eyes Open (seconds): L 10 sec, decreased stability noted compared to R side, able to hold 10 sec  WEIGHTBEARING ALIGNMENT: WNL  Lumbar/Pelvic WB Alignment:PSIS and iliac crest symmetrical  NON-WEIGHTBEARING ALIGNMENT: WNL   ROM:   (Degrees) Left AROM Left PROM  Right AROM Right PROM   Hip Flexion  WNL  WNL   Hip Extension  Min loss  WNL   Hip Abduction  WNL  WNL   Hip Adduction  WNL  WNL   Hip Internal Rotation  Mod loss  WNL   Hip External Rotation  Min loss  WNL   Knee Flexion       Knee Extension       Lumbar Side glide WNL WNL   Lumbar Flexion Min loss   Lumbar Extension Min loss   Pain:   End feel:     PELVIC/SI SCREEN:   STRENGTH: WNL and painfree 5/5  LE FLEXIBILITY: Decreased piriformis L, Decreased hip flexors L  SPECIAL TESTS:    Left Right   HERMINIA Negative  Negative    FADIR/Labrum/ERIC Negative  Negative    Femoral Nerve     Dmitri's     Piriformis     Quadrant Testing     SLR     Slump     Stork with Extension     Trevor Positive Negative           FUNCTIONAL TESTS: Double Leg Squat: Improper use of glutes/hips  Single Leg Squat: Hip internal rotation, Improper use of glutes/hips, and L > R side  PALPATION:  no tenderness to palpation on ASIS, iliopsoas, pubic symphysis or groin.    JOINT MOBILITY: WNL    Assessment & Plan   CLINICAL IMPRESSIONS  Medical Diagnosis: L groin pain    Treatment Diagnosis: L groin pain   Impression/Assessment: Patient is a 58 year old female with L groin pain complaints.  The following significant findings have been identified: Pain, Decreased ROM/flexibility, Decreased strength, and Impaired muscle performance. These impairments interfere with their ability to perform self care tasks, recreational activities, and community mobility as compared to previous level of function.     Clinical Decision Making  (Complexity):  Clinical Presentation: Stable/Uncomplicated  Clinical Presentation Rationale: based on medical and personal factors listed in PT evaluation  Clinical Decision Making (Complexity): Low complexity    PLAN OF CARE  Treatment Interventions:  Interventions: Manual Therapy, Neuromuscular Re-education, Therapeutic Activity, Therapeutic Exercise, Self-Care/Home Management    Long Term Goals     PT Goal 1  Goal Identifier: L groin pain  Goal Description: Pt will improve L hip stability demonstrated with good control during single leg squat  Rationale: to maximize safety and independence with performance of ADLs and functional tasks  Goal Progress: current demo poor stability and decreased L hip and lumbar flexion  Target Date: 03/31/24      Frequency of Treatment: 1 x a week  Duration of Treatment: 3 weeks tapering to 2 times a month x 2 months    Recommended Referrals to Other Professionals: Physical Therapy  Education Assessment:        Risks and benefits of evaluation/treatment have been explained.   Patient/Family/caregiver agrees with Plan of Care.     Evaluation Time:     PT Eval, Low Complexity Minutes (49809): 15       Signing Clinician: Irving Arrieta PT

## 2024-03-12 ENCOUNTER — THERAPY VISIT (OUTPATIENT)
Dept: PHYSICAL THERAPY | Facility: CLINIC | Age: 59
End: 2024-03-12
Payer: COMMERCIAL

## 2024-03-12 DIAGNOSIS — R10.32 GROIN PAIN, LEFT: Primary | ICD-10-CM

## 2024-03-12 PROCEDURE — 97110 THERAPEUTIC EXERCISES: CPT | Mod: GP | Performed by: PHYSICAL THERAPIST

## 2024-03-12 PROCEDURE — 97112 NEUROMUSCULAR REEDUCATION: CPT | Mod: GP | Performed by: PHYSICAL THERAPIST

## 2024-03-22 ENCOUNTER — THERAPY VISIT (OUTPATIENT)
Dept: PHYSICAL THERAPY | Facility: CLINIC | Age: 59
End: 2024-03-22
Payer: COMMERCIAL

## 2024-03-22 DIAGNOSIS — R10.32 GROIN PAIN, LEFT: Primary | ICD-10-CM

## 2024-03-22 PROCEDURE — 97110 THERAPEUTIC EXERCISES: CPT | Mod: GP | Performed by: PHYSICAL THERAPIST

## 2024-03-22 PROCEDURE — 97112 NEUROMUSCULAR REEDUCATION: CPT | Mod: GP | Performed by: PHYSICAL THERAPIST

## 2024-03-29 ENCOUNTER — THERAPY VISIT (OUTPATIENT)
Dept: PHYSICAL THERAPY | Facility: CLINIC | Age: 59
End: 2024-03-29
Payer: COMMERCIAL

## 2024-03-29 DIAGNOSIS — R10.32 GROIN PAIN, LEFT: Primary | ICD-10-CM

## 2024-03-29 PROCEDURE — 97110 THERAPEUTIC EXERCISES: CPT | Mod: GP | Performed by: PHYSICAL THERAPIST

## 2024-05-03 ENCOUNTER — THERAPY VISIT (OUTPATIENT)
Dept: PHYSICAL THERAPY | Facility: CLINIC | Age: 59
End: 2024-05-03
Payer: COMMERCIAL

## 2024-05-03 DIAGNOSIS — R10.32 GROIN PAIN, LEFT: Primary | ICD-10-CM

## 2024-05-03 PROCEDURE — 97110 THERAPEUTIC EXERCISES: CPT | Mod: GP | Performed by: PHYSICAL THERAPIST

## 2024-05-03 NOTE — PROGRESS NOTES
DISCHARGE  Reason for Discharge: Patient has met all goals.    Equipment Issued:     Discharge Plan: Patient to continue home program.  Discharge PT    Referring Provider:  Saige Blum     05/03/24 0500   Appointment Info   Signing clinician's name / credentials Irving Arrieta PT   Total/Authorized Visits 6 ET   Visits Used 5   Medical Diagnosis L groin pain   PT Tx Diagnosis L groin pain   Progress Note/Certification   Onset of illness/injury or Date of Surgery 02/09/24   Therapy Frequency 1 x a week   Predicted Duration 3 weeks tapering to 2 times a month x 2 months   Progress Note Completed Date 03/06/24   PT Goal 1   Goal Identifier L groin pain   Goal Description Pt will improve L hip stability demonstrated with good control during single leg squat   Rationale to maximize safety and independence with performance of ADLs and functional tasks   Goal Progress improved stability with single leg squat, min cues still required for deadlift   Target Date 04/28/24   Date Met 03/29/24   Subjective Report   Subjective Report reporting overall less pain in hip. Every now and then feels a twinge but overall less in intensity and frequency.  She feels good with running, lifting, yoga and cycling.  Pt reports that her knees are also feeling better.  She reports she is ready to continue to manage on her own   Objective Measures   Objective Measures Objective Measure 1   Objective Measure 1   Objective Measure good SL squat form with glut/hip stability   Treatment Interventions (PT)   Interventions Therapeutic Procedure/Exercise;Therapeutic Activity;Neuromuscular Re-education   Therapeutic Procedure/Exercise   Therapeutic Procedures: strength, endurance, ROM, flexibility minutes (70100) 30   PTRx Ther Proc 1 Prone Unilateral Lumbar Extension   PTRx Ther Proc 1 - Details HEP   PTRx Ther Proc 2 Prone Press Ups   PTRx Ther Proc 2 - Details continue HEP   PTRx Ther Proc 3 Standing Quadriceps Stretch   PTRx Ther Proc 3  - Details HEP   PTRx Ther Proc 4 Pretzel Stretch   PTRx Ther Proc 4 - Details HEP   PTRx Ther Proc 5 Single Leg Squat at Plinth   PTRx Ther Proc 5 - Details rev, good form   PTRx Ther Proc 6 Kneeling Hip Flexor Stretch   PTRx Ther Proc 6 - Details HEP   PTRx Ther Proc 7 Gluteal Myofascial Full Arc   PTRx Ther Proc 7 - Details HEP   PTRx Ther Proc 8 Gluteal Myofascial Piriformis Cruncher   PTRx Ther Proc 8 - Details HEP   PTRx Ther Proc 9 Lateral Step Down   PTRx Ther Proc 9 - Details rev for form, good, ok to reach back further for increase in ROM   PTRx Ther Proc 10 Single Leg Standing Deadlift   PTRx Ther Proc 10 - Details rev, good form   Ther Proc 1 rev yoga poses of chair pose, lateral lunge, forward and back lunge to work on form   Therapeutic Activity   PTRx Ther Act 1 Foam Roller Piriformis   PTRx Ther Act 1 - Details HEP   Neuromuscular Re-education   PTRx Neuro Re-ed 1 Supine Abdominal Exercise #4 (Leg Extension)   PTRx Neuro Re-ed 1 - Details HEP   PTRx Neuro Re-ed 2 Bridging #1   PTRx Neuro Re-ed 2 - Details hEP   Plan   Plan for next session d/c   Total Session Time   Timed Code Treatment Minutes 30   Total Treatment Time (sum of timed and untimed services) 30

## 2024-05-22 SDOH — HEALTH STABILITY: PHYSICAL HEALTH: ON AVERAGE, HOW MANY DAYS PER WEEK DO YOU ENGAGE IN MODERATE TO STRENUOUS EXERCISE (LIKE A BRISK WALK)?: 7 DAYS

## 2024-05-22 SDOH — HEALTH STABILITY: PHYSICAL HEALTH: ON AVERAGE, HOW MANY MINUTES DO YOU ENGAGE IN EXERCISE AT THIS LEVEL?: 50 MIN

## 2024-05-22 ASSESSMENT — SOCIAL DETERMINANTS OF HEALTH (SDOH): HOW OFTEN DO YOU GET TOGETHER WITH FRIENDS OR RELATIVES?: TWICE A WEEK

## 2024-05-23 ENCOUNTER — OFFICE VISIT (OUTPATIENT)
Dept: FAMILY MEDICINE | Facility: CLINIC | Age: 59
End: 2024-05-23
Payer: COMMERCIAL

## 2024-05-23 VITALS
SYSTOLIC BLOOD PRESSURE: 120 MMHG | BODY MASS INDEX: 23.58 KG/M2 | HEIGHT: 63 IN | TEMPERATURE: 97.6 F | HEART RATE: 60 BPM | WEIGHT: 133.1 LBS | RESPIRATION RATE: 16 BRPM | DIASTOLIC BLOOD PRESSURE: 80 MMHG | OXYGEN SATURATION: 99 %

## 2024-05-23 DIAGNOSIS — K59.09 OTHER CONSTIPATION: ICD-10-CM

## 2024-05-23 DIAGNOSIS — E78.5 HYPERLIPIDEMIA LDL GOAL <130: ICD-10-CM

## 2024-05-23 DIAGNOSIS — G43.009 MIGRAINE WITHOUT AURA AND WITHOUT STATUS MIGRAINOSUS, NOT INTRACTABLE: ICD-10-CM

## 2024-05-23 DIAGNOSIS — Z23 NEED FOR TDAP VACCINATION: ICD-10-CM

## 2024-05-23 DIAGNOSIS — Z82.62 FAMILY HISTORY OF OSTEOPOROSIS: ICD-10-CM

## 2024-05-23 DIAGNOSIS — R10.32 GROIN PAIN, LEFT: ICD-10-CM

## 2024-05-23 DIAGNOSIS — Z78.0 MENOPAUSE: ICD-10-CM

## 2024-05-23 DIAGNOSIS — Z00.00 ROUTINE HISTORY AND PHYSICAL EXAMINATION OF ADULT: Primary | ICD-10-CM

## 2024-05-23 LAB
ALBUMIN SERPL BCG-MCNC: 4.8 G/DL (ref 3.5–5.2)
ALP SERPL-CCNC: 94 U/L (ref 40–150)
ALT SERPL W P-5'-P-CCNC: 24 U/L (ref 0–50)
ANION GAP SERPL CALCULATED.3IONS-SCNC: 9 MMOL/L (ref 7–15)
AST SERPL W P-5'-P-CCNC: 34 U/L (ref 0–45)
BILIRUB SERPL-MCNC: 0.6 MG/DL
BUN SERPL-MCNC: 13.1 MG/DL (ref 8–23)
CALCIUM SERPL-MCNC: 9.7 MG/DL (ref 8.8–10.2)
CHLORIDE SERPL-SCNC: 104 MMOL/L (ref 98–107)
CHOLEST SERPL-MCNC: 188 MG/DL
CREAT SERPL-MCNC: 0.84 MG/DL (ref 0.51–0.95)
DEPRECATED HCO3 PLAS-SCNC: 27 MMOL/L (ref 22–29)
EGFRCR SERPLBLD CKD-EPI 2021: 79 ML/MIN/1.73M2
FASTING STATUS PATIENT QL REPORTED: YES
FASTING STATUS PATIENT QL REPORTED: YES
GLUCOSE SERPL-MCNC: 83 MG/DL (ref 70–99)
HDLC SERPL-MCNC: 59 MG/DL
LDLC SERPL CALC-MCNC: 117 MG/DL
NONHDLC SERPL-MCNC: 129 MG/DL
POTASSIUM SERPL-SCNC: 4.1 MMOL/L (ref 3.4–5.3)
PROT SERPL-MCNC: 7 G/DL (ref 6.4–8.3)
SODIUM SERPL-SCNC: 140 MMOL/L (ref 135–145)
TRIGL SERPL-MCNC: 62 MG/DL

## 2024-05-23 PROCEDURE — 90471 IMMUNIZATION ADMIN: CPT | Performed by: FAMILY MEDICINE

## 2024-05-23 PROCEDURE — 80061 LIPID PANEL: CPT | Performed by: FAMILY MEDICINE

## 2024-05-23 PROCEDURE — 80053 COMPREHEN METABOLIC PANEL: CPT | Performed by: FAMILY MEDICINE

## 2024-05-23 PROCEDURE — 36415 COLL VENOUS BLD VENIPUNCTURE: CPT | Performed by: FAMILY MEDICINE

## 2024-05-23 PROCEDURE — 99214 OFFICE O/P EST MOD 30 MIN: CPT | Mod: 25 | Performed by: FAMILY MEDICINE

## 2024-05-23 PROCEDURE — 90715 TDAP VACCINE 7 YRS/> IM: CPT | Performed by: FAMILY MEDICINE

## 2024-05-23 PROCEDURE — 99396 PREV VISIT EST AGE 40-64: CPT | Mod: 25 | Performed by: FAMILY MEDICINE

## 2024-05-23 RX ORDER — RIZATRIPTAN BENZOATE 10 MG/1
10 TABLET ORAL
Qty: 9 TABLET | Refills: 1 | Status: SHIPPED | OUTPATIENT
Start: 2024-05-23

## 2024-05-23 RX ORDER — AMOXICILLIN 500 MG
CAPSULE ORAL
COMMUNITY
Start: 2023-05-22

## 2024-05-23 RX ORDER — ATORVASTATIN CALCIUM 10 MG/1
10 TABLET, FILM COATED ORAL DAILY
Qty: 90 TABLET | Refills: 3 | Status: SHIPPED | OUTPATIENT
Start: 2024-05-23 | End: 2024-06-24

## 2024-05-23 RX ORDER — POLYETHYLENE GLYCOL 3350 17 G/17G
17 POWDER, FOR SOLUTION ORAL DAILY
Qty: 507 G | Refills: 11 | Status: SHIPPED | OUTPATIENT
Start: 2024-05-23

## 2024-05-23 ASSESSMENT — PAIN SCALES - GENERAL: PAINLEVEL: NO PAIN (0)

## 2024-05-23 NOTE — PROGRESS NOTES
Preventive Care Visit  [facility]  Saige Blum MD, Family Medicine  May 23, 2024      Assessment & Plan     (Z00.00) Routine history and physical examination of adult  (primary encounter diagnosis)  Comment:   Plan: REVIEW OF HEALTH MAINTENANCE PROTOCOL ORDERS            (E78.5) Hyperlipidemia LDL goal <130  Comment:   Plan: Lipid panel reflex to direct LDL Non-fasting,         atorvastatin (LIPITOR) 10 MG tablet,         Comprehensive metabolic panel        Check labs. Refill sent. Healthy diet and exercise reviewed.  Encourage exercise.follow-up as needed       (Z23) Need for Tdap vaccination  Comment:   Plan: TDAP 7+ (ADACEL,BOOSTRIX)            (G43.009) Migraine without aura and without status migrainosus, not intractable  Comment: well controlled.  Plan: rizatriptan (MAXALT) 10 MG tablet        Need refill, gets infrequently  now , no change otherwise and she thinks she has improved much.  so wants to continue with same med's . Refill sent. Follow-up as needed     (K59.09) Other constipation  Comment: stable , needs refill   Plan: polyethylene glycol (MIRALAX) 17 GM/Dose powder      (R10.32) Groin pain, left  Comment: improved  after PT. Has occasional discomfort but not concerned   Plan: Cares and symptomatic treatment discussed. follow up if problem               (Z78.0) Menopause  Comment:   Plan: DX Bone Density            (Z82.62) Family history of osteoporosis  Comment:   Plan: DX Bone Density          Wish to proceed with  bone density test bc mom had osteoporosis .should work on taking in 2579-0743 mg of calcium with vitamin D daily. should also be getting daily weight bearing exercise (walking works)    Check labs. refill sent.Cares and  treatment discussed follow. up if problem   Patient expressed understanding and agreement with treatment plan. All patient's questions were answered, will let me know if has more later.  Medications: Rx's: Reviewed the potential  side effects/complications of medications prescribed.       Counseling  Appropriate preventive services were discussed with this patient, including applicable screening as appropriate for fall prevention, nutrition, physical activity, Tobacco-use cessation, weight loss and cognition.  Checklist reviewing preventive services available has been given to the patient.  Reviewed patient's diet, addressing concerns and/or questions.   She is at risk for psychosocial distress and has been provided with information to reduce risk.       Regular exercise  See Patient Instructions    Froylan De La Paz is a 58 year old, presenting for the following:  Physical (Fasting. ) and Follow Up (Improving groin pain. )        5/23/2024     7:03 AM   Additional Questions   Roomed by Anais ALFONSO        Health Care Directive  Patient does not have a Health Care Directive or Living Will: Discussed advance care planning with patient; however, patient declined at this time.    HPI    Hyperlipidemia Follow-Up    Are you regularly taking any medication or supplement to lower your cholesterol?   Yes- see epic   Are you having muscle aches or other side effects that you think could be caused by your cholesterol lowering medication?  No    Migrain  has  improved . may be once a month and medication - Maxalt still works fine , could use refill. Mostly her sx resolve after one dose and occasionally she has to take a second dose. No change in he sx otherwise          Follow up groin Pain :  When did you first notice your pain? 3 months ago   Have you seen this provider for your pain in the past? Yes   Where in your body do you have pain? Groin area, it has improved and PT has helps a lot. has occasional discomfort but not needing any pain med's now.   Are you seeing anyone else for your pain? Yes - Physical Therapist            5/22/2024   General Health   How would you rate your overall physical health? Excellent   Feel stress (tense, anxious, or  unable to sleep) Only a little   (!) STRESS CONCERN      5/22/2024   Nutrition   Three or more servings of calcium each day? Yes   Diet: Regular (no restrictions)   How many servings of fruit and vegetables per day? (!) 2-3   How many sweetened beverages each day? 0-1         5/22/2024   Exercise   Days per week of moderate/strenous exercise 7 days   Average minutes spent exercising at this level 50 min         5/22/2024   Social Factors   Frequency of gathering with friends or relatives Twice a week   Worry food won't last until get money to buy more No   Food not last or not have enough money for food? No   Do you have housing?  Yes   Are you worried about losing your housing? No   Lack of transportation? No   Unable to get utilities (heat,electricity)? No         5/22/2024   Fall Risk   Fallen 2 or more times in the past year? No   Trouble with walking or balance? No          5/22/2024   Dental   Dentist two times every year? Yes         5/22/2024   TB Screening   Were you born outside of the [place]? No           Today's PHQ-2 Score:       2/15/2024     5:47 AM   PHQ-2 ( 1999 [employer])   Q1: Little interest or pleasure in doing things 0   Q2: Feeling down, depressed or hopeless 0   PHQ-2 Score 0   Q1: Little interest or pleasure in doing things Not at all   Q2: Feeling down, depressed or hopeless Not at all   PHQ-2 Score 0         5/22/2024   Substance Use   Alcohol more than 3/day or more than 7/wk No   Do you use any other substances recreationally? No     Social History     Tobacco Use    Smoking status: Never    Smokeless tobacco: Never   Vaping Use    Vaping status: Never Used   Substance Use Topics    Alcohol use: Yes     Comment: occasionally    Drug use: No             5/22/2024   Breast Cancer Screening   Family history of breast, colon, or ovarian cancer? Yes         5/22/2024   LAST FHS-7 RESULTS   1st degree relative breast or ovarian cancer No   Any relative bilateral breast cancer No   Any male have  breast cancer No   Any ONE woman have BOTH breast AND ovarian cancer No   Any woman with breast cancer before 50yrs Yes   2 or more relatives with breast AND/OR ovarian cancer No   2 or more relatives with breast AND/OR bowel cancer No        Mammogram Screening - Mammogram every 1-2 years updated in Health Maintenance based on mutual decision making        2024   STI Screening   New sexual partner(s) since last STI/HIV test? No     History of abnormal Pap smear: No - age 30-64 HPV with reflex Pap every 5 years recommended        Latest Ref Rng & Units 5/10/2023     7:18 AM 2020    12:00 AM 2007    12:00 AM   PAP / HPV   PAP  Negative for Intraepithelial Lesion or Malignancy (NILM)      PAP (Historical)    NIL    HPV 16 DNA Negative Negative      HPV 18 DNA Negative Negative      Other HR HPV Negative Negative      PAP-ABSTRACT   See Scanned Document            This result is from an external source.     ASCVD Risk   The 10-year ASCVD risk score (Cynthia TRAN, et al., 2019) is: 2.7%    Values used to calculate the score:      Age: 58 years      Sex: Female      Is Non- : No      Diabetic: No      Tobacco smoker: No      Systolic Blood Pressure: 120 mmHg      Is BP treated: No      HDL Cholesterol: 57 mg/dL      Total Cholesterol: 188 mg/dL    Fracture Risk Assessment Tool  Link to Frax Calculator  Use the information below to complete the Frax calculator  : 1965  Sex: female  Weight (kg): 60.4 kg (actual weight)  Height (cm): 160.3 cm  Previous Fragility Fracture:  No  History of parent with fractured hip:  Yes  Current Smoking:  No  Patient has been on glucocorticoids for more than 3 months (5mg/day or more): No  Rheumatoid Arthritis on Problem List:  No  Secondary Osteoporosis on Problem List:  No  Consumes 3 or more units of alcohol per day: No  Femoral Neck BMD (g/cm2)             Reviewed and updated as needed this visit by Provider                    Patient  Active Problem List   Diagnosis    Other type of intractable migraine    Migraine without aura and without status migrainosus, not intractable    Hyperlipidemia LDL goal <130    Gluteal pain    Constipation    Hx of hearing loss    Pelvic somatic dysfunction    Uterine leiomyoma, unspecified location    Menopause present    Groin pain, left     Past Surgical History:   Procedure Laterality Date    C/SECTION, LOW TRANSVERSE  1999    COLONOSCOPY  11/8/2013    Procedure: COLONOSCOPY;  COLONOSCOPY ;  Surgeon: Geeta Stevens MD;  Location: [facility]       Social History     Tobacco Use    Smoking status: Never    Smokeless tobacco: Never   Substance Use Topics    Alcohol use: Yes     Comment: occasionally     Family History   Problem Relation Age of Onset    Thyroid Disease Father     Cerebrovascular Disease Father         TIA , at age 81     Lipids Mother     Hyperlipidemia Mother     Hyperlipidemia Sister     Hyperlipidemia Brother     Mental Illness Other     Diabetes No family hx of     Coronary Artery Disease No family hx of     Colon Cancer No family hx of     Breast Cancer No family hx of              Review of Systems  CONSTITUTIONAL: NEGATIVE for fever, chills, change in weight  INTEGUMENTARY/SKIN: NEGATIVE for worrisome rashes, moles or lesions  EYES: NEGATIVE for vision changes or irritation  ENT/MOUTH: NEGATIVE for ear, mouth and throat problems  RESP: NEGATIVE for significant cough or SOB  BREAST: NEGATIVE for masses, tenderness or discharge  CV: NEGATIVE for chest pain, palpitations or peripheral edema  GI: NEGATIVE for nausea, abdominal pain, heartburn, or change in bowel habits  : NEGATIVE for frequency, dysuria, or hematuria  MUSCULOSKELETAL: NEGATIVE for significant arthralgias or myalgia  NEURO: NEGATIVE for weakness, dizziness or paresthesias  NEURO: Hx headaches-migraine  ENDOCRINE: NEGATIVE for temperature intolerance, skin/hair changes  HEME: NEGATIVE for bleeding problems  PSYCHIATRIC: NEGATIVE  "for changes in mood or affect     Objective    Exam  /80   Pulse 60   Temp 97.6  F (36.4  C) (Temporal)   Resp 16   Ht 1.603 m (5' 3.11\")   Wt 60.4 kg (133 lb 1.6 oz)   LMP 11/25/2007   SpO2 99%   BMI 23.50 kg/m     Estimated body mass index is 23.5 kg/m  as calculated from the following:    Height as of this encounter: 1.603 m (5' 3.11\").    Weight as of this encounter: 60.4 kg (133 lb 1.6 oz).    Physical Exam  GENERAL: alert and no distress  EYES: Eyes grossly normal to inspection, PERRL and conjunctivae and sclerae normal  HENT: ear canals and TM's normal, nose and mouth without ulcers or lesions  NECK: no adenopathy, no asymmetry, masses, or scars  BREAST EXAM normal. No mass. no lumps.No skin changes, nipple normal bilaterally. Axillae negative of lymph nodes   RESP: lungs clear to auscultation - no rales, rhonchi or wheezes  CV: regular rate and rhythm, normal S1 S2, no S3 or S4, no murmur, click or rub, no peripheral edema  ABDOMEN: soft, nontender, no hepatosplenomegaly, no masses and bowel sounds normal  MS: no gross musculoskeletal defects noted, no edema  SKIN: no suspicious lesions or rashes  NEURO: Normal strength and tone, mentation intact and speech normal  PSYCH: mentation appears normal, affect normal/bright        Signed Electronically by: Saige Blum MD    "

## 2024-05-28 DIAGNOSIS — E78.5 HYPERLIPIDEMIA LDL GOAL <130: Primary | ICD-10-CM

## 2024-05-28 RX ORDER — ATORVASTATIN CALCIUM 20 MG/1
20 TABLET, FILM COATED ORAL DAILY
Qty: 90 TABLET | Refills: 0 | Status: SHIPPED | OUTPATIENT
Start: 2024-05-28 | End: 2024-08-21

## 2024-05-28 NOTE — TELEPHONE ENCOUNTER
Will need follow-up  recheck in 3 months   Virtual/ Video visit ok  If no appointment available soon enough

## 2024-05-28 NOTE — TELEPHONE ENCOUNTER
"Nurse Triage SBAR    Is this a 2nd Level Triage? YES, LICENSED PRACTITIONER REVIEW IS REQUIRED    Situation: \"Dr. Blum sent me a message regarding atorvastatin, If I wanted to change from 10 mg to 20 mg and ye, I would like to do that.\"      Rx and pharmacy pended.   "

## 2024-06-10 ENCOUNTER — HOSPITAL ENCOUNTER (OUTPATIENT)
Dept: BONE DENSITY | Facility: CLINIC | Age: 59
Discharge: HOME | End: 2024-06-10
Attending: FAMILY MEDICINE
Payer: COMMERCIAL

## 2024-06-10 ENCOUNTER — HOSPITAL ENCOUNTER (OUTPATIENT)
Dept: MAMMOGRAPHY | Facility: CLINIC | Age: 59
Discharge: HOME | End: 2024-06-10
Attending: FAMILY MEDICINE
Payer: COMMERCIAL

## 2024-06-10 DIAGNOSIS — Z12.31 VISIT FOR SCREENING MAMMOGRAM: ICD-10-CM

## 2024-06-10 DIAGNOSIS — Z82.62 FAMILY HISTORY OF OSTEOPOROSIS: ICD-10-CM

## 2024-06-10 DIAGNOSIS — Z78.0 MENOPAUSE: ICD-10-CM

## 2024-06-10 PROCEDURE — 77080 DXA BONE DENSITY AXIAL: CPT

## 2024-06-10 PROCEDURE — 77063 BREAST TOMOSYNTHESIS BI: CPT

## 2024-06-17 NOTE — PATIENT INSTRUCTIONS
Patient Education     Relieving Back Pain  Back pain is a common problem. You can strain back muscles by lifting too much weight or just by moving the wrong way. Back strain can be uncomfortable, even painful. And it can take weeks or months to improve. To help yourself feel better and prevent future back strains, try these tips.  Important: Don't give aspirin to children or teens without first discussing it with your child's healthcare provider.  Ice    Ice reduces muscle pain and swelling. It helps most during the first 24 to 48 hours after an injury.    Wrap an ice pack or a bag of frozen peas in a thin towel. Never put ice directly on your skin.    Place the ice where your back hurts the most.    Don t ice for more than 20 minutes at a time.    You can use ice several times a day.  Medicines  Over-the-counter pain relievers include acetaminophen and anti-inflammatory medicines, which includes aspirin, naproxen, or ibuprofen. They can help ease discomfort. Some also reduce swelling.    Tell your healthcare provider about any medicines you are already taking.    Take medicines only as directed.  Manipulation and massage  Having manipulation by an osteopathic doctor or chiropractor may be helpful. Getting a massage also may help.   Heat  After the first 48 hours, heat can relax sore muscles and improve blood flow.    Try a warm bath or shower. Or use a heating pad set on low. To prevent a burn, keep a cloth between you and the heating pad.    Don t use a heating pad for more than 15 minutes at a time. Never sleep on a heating pad.  [employer] last reviewed this educational content on 6/1/2018 2000-2021 [employer]. All rights reserved. This information is not intended as a substitute for professional medical care. Always follow your healthcare professional's instructions.           Patient Education     Constipation (Adult)  Constipation means that you have bowel movements that are less frequent  An angiography was performed of the aorta. The angiography was performed via power injection. The injected amount was 40 mL contrast at 20 mL/s. The PSI from the power injection was 1000. than usual. Stools often become very hard and difficult to pass.  Constipation is very common. At some point in life, it affects almost everyone. Since everyone's bowel habits are different, what is constipation to one person may not be to another. Your healthcare provider may do tests to diagnose constipation. It depends on what he or she finds when evaluating you.    Symptoms of constipation include:    Abdominal pain    Bloating    Vomiting    Painful bowel movements    Itching, swelling, bleeding, or pain around the anus  Causes  Constipation can have many causes. These include:    Diet low in fiber    Too much dairy    Not drinking enough liquids    Lack of exercise or physical activity (especially true for older adults)    Changes in lifestyle or daily routine, including pregnancy, aging, work, and travel    Frequent use or misuse of laxatives    Ignoring the urge to have a bowel movement or delaying it until later    Medicines, such as certain prescription pain medicines, iron supplements, antacids, certain antidepressants, and calcium supplements    Diseases like irritable bowel syndrome, bowel obstructions, stroke, diabetes, thyroid disease, Parkinson disease, hemorrhoids, and colon cancer  Complications  Potential complications of constipation can include:    Hemorrhoids    Rectal bleeding from hemorrhoids or anal fissures (skin tears)    Hernias    Dependency on laxatives    Chronic constipation    Fecal impaction, a severe form of constipation in which a large amount of hard stool is in your rectum that you can't pass    Bowel obstruction or perforation  Home care  All treatment should be done after talking with your healthcare provider. This is especially true if you have another medical problems, are taking prescription medicines, or are an older adult. Treatment most often involves lifestyle changes. You may also need medicines. Your healthcare provider will tell you which will work best for you.  Follow the advice below to help avoid this problem in the future.  Lifestyle changes  These lifestyle changes can help prevent constipation:    Diet. Eat a high-fiber diet, with fresh fruit and vegetables, and reduce dairy intake, meats, and processed foods    Fluids. It's important to get enough fluids each day. Drink plenty of water when you eat more fiber. If you are on diet that limits the amount of fluid you can have, talk about this with your healthcare provider.    Regular exercise. Check with your healthcare provider first.  Medicines  Take any medicines as directed. Some laxatives are safe to use only every now and then. Others can be taken on a regular basis. While laxatives don't cause bowel dependence, they are treating the symptoms. So your constipation may return if you don't make other changes. Talk with your healthcare provider or pharmacist if you have questions.  Prescription pain medicines can cause constipation. If you are taking this kind of medicine, ask your healthcare provider if you should also take a stool softener.  Medicines you may take to treat constipation include:    Fiber supplements    Stool softeners    Laxatives    Enemas    Rectal suppositories  Follow-up care  Follow up with your healthcare provider if symptoms don't get better in the next few days. You may need to have more tests or see a specialist.  Call 911  Call 911 if any of these occur:    Trouble breathing    Stiff, rigid abdomen that is severely painful to touch    Confusion    Fainting or loss of consciousness    Rapid heart rate    Chest pain  When to seek medical advice  Call your healthcare provider right away if any of these occur:    Fever of 100.4 F (38 C) or higher, or as directed by your healthcare provider    Failure to resume normal bowel movements    Pain in your abdomen or back gets worse    Nausea or vomiting    Swelling in your abdomen    Blood in the stool    Black, tarry stool    Involuntary weight  loss    Weakness  [employer] last reviewed this educational content on 6/1/2018 2000-2021 [employer]. All rights reserved. This information is not intended as a substitute for professional medical care. Always follow your healthcare professional's instructions.

## 2024-06-24 ENCOUNTER — VIRTUAL VISIT (OUTPATIENT)
Dept: FAMILY MEDICINE | Facility: CLINIC | Age: 59
End: 2024-06-24
Payer: COMMERCIAL

## 2024-06-24 DIAGNOSIS — M81.8 OTHER OSTEOPOROSIS WITHOUT CURRENT PATHOLOGICAL FRACTURE: Primary | ICD-10-CM

## 2024-06-24 PROCEDURE — 99213 OFFICE O/P EST LOW 20 MIN: CPT | Mod: 95 | Performed by: FAMILY MEDICINE

## 2024-06-24 RX ORDER — CALCIUM CARBONATE-CHOLECALCIFEROL TAB 250 MG-125 UNIT 250-125 MG-UNIT
1 TAB ORAL 2 TIMES DAILY
COMMUNITY
Start: 2024-06-24

## 2024-06-24 RX ORDER — ALENDRONATE SODIUM 70 MG/1
70 TABLET ORAL
Qty: 4 TABLET | Refills: 2 | Status: SHIPPED | OUTPATIENT
Start: 2024-06-24 | End: 2024-09-12

## 2024-06-24 NOTE — PROGRESS NOTES
Ana Cristina is a 58 year old who is being evaluated via a billable video visit.    How would you like to obtain your AVS? MyChart  If the video visit is dropped, the invitation should be resent by: Text to cell phone: [phone]  Will anyone else be joining your video visit? No      Assessment & Plan     (M81.8) Other osteoporosis without current pathological fracture  (primary encounter diagnosis)  Comment:   Plan: calcium carbonate-vitamin D (CALCIUM-VITAMIN         D3) 250-3.125 MG-MCG TABS per tablet,                    The patient's BMD is consistent with osteoporosis, and she is at increased fracture risk,  this would merit treatment according to the Bone Health and Osteoporosis Foundation.  Discussed risk/ treatment options/ pros/ cons of med's including GI, s/e , risk on osteonecrosis - going for routine dental checks and keeping informed her dentist about her treatment etc    discuss the merits of  Taking med's for this.She is willing to start treatment with fosamax    should also be taking [id number] mg of calcium with vit D.   should be exercising regularly.  Follow up check in 2-3 months, but sooner if problem office to   Patient expressed understanding and agreement with treatment plan. All patient's questions were answered, will let me know if has more later.  Medications: Rx's: Reviewed the potential side effects/complications of medications prescribed.       Regular exercise  See Patient Instructions    Subjective   Ana Cristina is a 58 year old, presenting for the following health issues:  No chief complaint on file.        6/24/2024    11:23 AM   Additional Questions   Roomed by Farhat OSORIO     History of Present Illness       Reason for visit:  Osteoporosis treatment options    She eats 2-3 servings of fruits and vegetables daily.She consumes 0 sweetened beverage(s) daily.She exercises with enough effort to increase her heart rate 30 to 60 minutes per day.  She exercises with enough effort to increase her  heart rate 7 days per week.   She is taking medications regularly.        here to discuss recent bone density test and treatment option . Has family hx of osteoporosis in family members as well . non smoker, alcohol social occasional,    Here  is the recent dex report   The World Health Organization (WHO) criteria is applicable to perimenopausal females, postmenopausal females, and men aged 50 years or older.     FRACTURE RISK  -The FRAX risk calculator is not applicable due to osteoporosis.     RECOMMENDATIONS  The patient's BMD is consistent with osteoporosis, and he/she is at increased fracture risk. If not currently being treated for low BMD, this would merit treatment according to the Bone Health and Osteoporosis Foundation.                                                                      IMPRESSION: OSTEOPOROSIS. T score meets the WHO criteria for osteoporosis at one or more measured sites. The risk of osteoporotic fracture increases approximately two-fold for each standard deviation decrease in T-score.        Review of Systems  Constitutional, HEENT, cardiovascular, pulmonary, GI, , musculoskeletal, neuro, skin, endocrine and psych systems are negative, except as otherwise noted.      Objective           Vitals:  No vitals were obtained today due to virtual visit.    Physical Exam   GENERAL: alert and no distress  EYES: Eyes grossly normal to inspection.  No discharge or erythema, or obvious scleral/conjunctival abnormalities.  RESP: No audible wheeze, cough, or visible cyanosis.    NEURO: Cranial nerves grossly intact.  Mentation and speech appropriate for age.  PSYCH: Appropriate affect, tone, and pace of words          Video-Visit Details    Type of service:  Video Visit   Originating Location (pt. Location): Home    Distant Location (provider location):  On-site  Platform used for Video Visit: Roque  Signed Electronically by: Saige Blum MD

## 2024-06-25 ENCOUNTER — TELEPHONE (OUTPATIENT)
Dept: FAMILY MEDICINE | Facility: CLINIC | Age: 59
End: 2024-06-25
Payer: COMMERCIAL

## 2024-08-21 DIAGNOSIS — E78.5 HYPERLIPIDEMIA LDL GOAL <130: ICD-10-CM

## 2024-08-21 RX ORDER — ATORVASTATIN CALCIUM 20 MG/1
20 TABLET, FILM COATED ORAL DAILY
Qty: 90 TABLET | Refills: 2 | Status: SHIPPED | OUTPATIENT
Start: 2024-08-21 | End: 2024-09-12

## 2024-09-12 ENCOUNTER — OFFICE VISIT (OUTPATIENT)
Dept: FAMILY MEDICINE | Facility: CLINIC | Age: 59
End: 2024-09-12
Payer: COMMERCIAL

## 2024-09-12 VITALS
TEMPERATURE: 97.6 F | HEART RATE: 65 BPM | OXYGEN SATURATION: 99 % | SYSTOLIC BLOOD PRESSURE: 100 MMHG | RESPIRATION RATE: 18 BRPM | DIASTOLIC BLOOD PRESSURE: 68 MMHG | WEIGHT: 132.3 LBS | BODY MASS INDEX: 23.35 KG/M2

## 2024-09-12 DIAGNOSIS — Z00.00 ROUTINE HISTORY AND PHYSICAL EXAMINATION OF ADULT: Primary | ICD-10-CM

## 2024-09-12 DIAGNOSIS — M81.8 OTHER OSTEOPOROSIS WITHOUT CURRENT PATHOLOGICAL FRACTURE: ICD-10-CM

## 2024-09-12 DIAGNOSIS — E78.5 HYPERLIPIDEMIA LDL GOAL <130: ICD-10-CM

## 2024-09-12 LAB
ALT SERPL W P-5'-P-CCNC: 20 U/L (ref 0–50)
CHOLEST SERPL-MCNC: 174 MG/DL
FASTING STATUS PATIENT QL REPORTED: YES
HDLC SERPL-MCNC: 54 MG/DL
LDLC SERPL CALC-MCNC: 104 MG/DL
NONHDLC SERPL-MCNC: 120 MG/DL
TRIGL SERPL-MCNC: 78 MG/DL

## 2024-09-12 PROCEDURE — 99396 PREV VISIT EST AGE 40-64: CPT | Performed by: FAMILY MEDICINE

## 2024-09-12 PROCEDURE — 84460 ALANINE AMINO (ALT) (SGPT): CPT | Performed by: FAMILY MEDICINE

## 2024-09-12 PROCEDURE — 99214 OFFICE O/P EST MOD 30 MIN: CPT | Mod: 25 | Performed by: FAMILY MEDICINE

## 2024-09-12 PROCEDURE — 36415 COLL VENOUS BLD VENIPUNCTURE: CPT | Performed by: FAMILY MEDICINE

## 2024-09-12 PROCEDURE — 80061 LIPID PANEL: CPT | Performed by: FAMILY MEDICINE

## 2024-09-12 RX ORDER — ATORVASTATIN CALCIUM 20 MG/1
20 TABLET, FILM COATED ORAL DAILY
Qty: 90 TABLET | Refills: 3 | Status: SHIPPED | OUTPATIENT
Start: 2024-09-12

## 2024-09-12 RX ORDER — ALENDRONATE SODIUM 70 MG/1
70 TABLET ORAL
Qty: 12 TABLET | Refills: 4 | Status: SHIPPED | OUTPATIENT
Start: 2024-09-12

## 2024-09-12 ASSESSMENT — PAIN SCALES - GENERAL: PAINLEVEL: NO PAIN (0)

## 2024-09-12 NOTE — PROGRESS NOTES
Assessment & Plan     (Z00.00) Routine history and physical examination of adult  (primary encounter diagnosis)  Comment:   Plan:     (E78.5) Hyperlipidemia LDL goal <130  Comment: dose was increased  - due for labs and need refill  Plan: atorvastatin (LIPITOR) 20 MG tablet, Lipid         panel reflex to direct LDL Fasting, ALT            (M81.8) Other osteoporosis without current pathological fracture  Comment: doing well and wants to continue  Plan: alendronate (FOSAMAX) 70 MG tablet          The bone density test shows osteoporosis. Doing well on fosamax  People with osteopenia should work on taking in 0510-3672 mg of calcium with vitamin D daily. They should also be getting daily weight bearing exercise (walking works)      Check labs. refill sent.Cares and  treatment discussed follow. up if problem   Patient expressed understanding and agreement with treatment plan. All patient's questions were answered, will let me know if has more later.  Medications: Rx's: Reviewed the potential side effects/complications of medications prescribed.         Regular exercise  See Patient Instructions    Froylan De La Paz is a 58 year old, presenting for the following health issues:  Recheck Medication (Fasting) and recheck labs        9/12/2024     7:05 AM   Additional Questions   Roomed by Rupinder DAMIAN     History of Present Illness       Reason for visit:  Follow up on medications given at my last appointment    She eats 2-3 servings of fruits and vegetables daily.She consumes 0 sweetened beverage(s) daily.She exercises with enough effort to increase her heart rate 30 to 60 minutes per day.  She exercises with enough effort to increase her heart rate 7 days per week.   She is taking medications regularly.         Hyperlipidemia Follow-Up    Are you regularly taking any medication or supplement to lower your cholesterol?   Yes- see epic  dose was increased - due for labs and need refill   Are you having muscle aches or other  side effects that you think could be caused by your cholesterol lowering medication?  No      Also stared fosamax recently for osteoporosis tolerating med's well and wants to continue and need refill       Review of Systems  CONSTITUTIONAL: NEGATIVE for fever, chills, change in weight  INTEGUMENTARY/SKIN: NEGATIVE for worrisome rashes, moles or lesions  EYES: NEGATIVE for vision changes or irritation  ENT/MOUTH: NEGATIVE for ear, mouth and throat problems  RESP: NEGATIVE for significant cough or SOB  BREAST: NEGATIVE for masses, tenderness or discharge  CV: NEGATIVE for chest pain, palpitations or peripheral edema  GI: NEGATIVE for nausea, abdominal pain, heartburn, or change in bowel habits  : NEGATIVE for frequency, dysuria, or hematuria  MUSCULOSKELETAL: NEGATIVE for significant arthralgias or myalgia  NEURO: NEGATIVE for weakness, dizziness or paresthesias  ENDOCRINE: NEGATIVE for temperature intolerance, skin/hair changes  HEME: NEGATIVE for bleeding problems  PSYCHIATRIC: NEGATIVE for changes in mood or affect      Objective    /68   Pulse 65   Temp 97.6  F (36.4  C)   Resp 18   Wt 60 kg (132 lb 4.8 oz)   LMP 11/25/2007   SpO2 99%   BMI 23.35 kg/m    Body mass index is 23.35 kg/m .  Physical Exam   GENERAL: alert and no distress  EYES: Eyes grossly normal to inspection, PERRL and conjunctivae and sclerae normal  HENT: ear canals and TM's normal, nose and mouth without ulcers or lesions  NECK: no adenopathy, no asymmetry, masses, or scars  RESP: lungs clear to auscultation - no rales, rhonchi or wheezes  CV: regular rate and rhythm, normal S1 S2, no S3 or S4, no murmur, click or rub, no peripheral edema  ABDOMEN: soft, nontender, no hepatosplenomegaly, no masses and bowel sounds normal  MS: no gross musculoskeletal defects noted, no edema  SKIN: no suspicious lesions or rashes  NEURO: Normal strength and tone, mentation intact and speech normal  PSYCH: mentation appears normal, affect  normal/bright            Signed Electronically by: Saige Blum MD

## 2024-09-23 ENCOUNTER — APPOINTMENT (OUTPATIENT)
Dept: URBAN - METROPOLITAN AREA CLINIC 256 | Age: 59
Setting detail: DERMATOLOGY
End: 2024-09-23

## 2024-09-23 VITALS — WEIGHT: 130 LBS | HEIGHT: 63.5 IN

## 2024-09-23 DIAGNOSIS — L60.3 NAIL DYSTROPHY: ICD-10-CM

## 2024-09-23 DIAGNOSIS — L03.03 CELLULITIS OF TOE: ICD-10-CM

## 2024-09-23 PROBLEM — L03.039 CELLULITIS OF UNSPECIFIED TOE: Status: ACTIVE | Noted: 2024-09-23

## 2024-09-23 PROCEDURE — OTHER MIPS QUALITY: OTHER

## 2024-09-23 PROCEDURE — OTHER PRESCRIPTION MEDICATION MANAGEMENT: OTHER

## 2024-09-23 PROCEDURE — OTHER PATIENT SPECIFIC COUNSELING: OTHER

## 2024-09-23 PROCEDURE — 99213 OFFICE O/P EST LOW 20 MIN: CPT

## 2024-09-23 PROCEDURE — OTHER PHOTO-DOCUMENTATION: OTHER

## 2024-09-23 PROCEDURE — OTHER PRESCRIPTION: OTHER

## 2024-09-23 PROCEDURE — OTHER COUNSELING: OTHER

## 2024-09-23 RX ORDER — CLOBETASOL PROPIONATE 0.5 MG/G
OINTMENT TOPICAL
Qty: 15 | Refills: 0 | Status: ERX | COMMUNITY
Start: 2024-09-23

## 2024-09-23 NOTE — HPI: SKIN LESION
Is This A New Presentation, Or A Follow-Up?: Skin Lesions
Additional History: R ingrown nail\\nL nail discolored and more tender around the nail\\n\\nNo treatments yet \\Nathan developed couple months

## 2024-09-23 NOTE — PROCEDURE: PATIENT SPECIFIC COUNSELING
- Told the patient to be cautious when getting pedicures. \\n- Advised the patient to let nail grow out from the flesh.\\nWe will use clobetasol for a short time to reduce the inflammation in the nail fold
Detail Level: Zone
- Discussed with the patient that her nails could be affected by nail polish vs nail fungus vs trauma.\\n- It appears that her nail is likely from trauma of nail.\\n- Advised the patient to not trim back on edges and to not wear nail polish for a while. \\n- Discussed using Antifungal but it won’t do much.\\n- Explained that it’s a slow growing process.

## 2024-09-23 NOTE — PROCEDURE: PRESCRIPTION MEDICATION MANAGEMENT
Initiate Treatment: Clobetasol 0.05% topical ointment BID onto right great toe
Render In Strict Bullet Format?: No
Detail Level: Zone

## 2025-03-03 ENCOUNTER — APPOINTMENT (OUTPATIENT)
Dept: URBAN - METROPOLITAN AREA CLINIC 256 | Age: 60
Setting detail: DERMATOLOGY
End: 2025-03-03

## 2025-03-03 DIAGNOSIS — L03.03 CELLULITIS OF TOE: ICD-10-CM

## 2025-03-03 PROBLEM — D23.61 OTHER BENIGN NEOPLASM OF SKIN OF RIGHT UPPER LIMB, INCLUDING SHOULDER: Status: ACTIVE | Noted: 2025-03-03

## 2025-03-03 PROBLEM — L03.039 CELLULITIS OF UNSPECIFIED TOE: Status: ACTIVE | Noted: 2025-03-03

## 2025-03-03 PROCEDURE — OTHER PHOTO-DOCUMENTATION: OTHER

## 2025-03-03 PROCEDURE — OTHER COUNSELING: OTHER

## 2025-03-03 PROCEDURE — 99212 OFFICE O/P EST SF 10 MIN: CPT

## 2025-03-03 PROCEDURE — OTHER MIPS QUALITY: OTHER

## 2025-03-03 PROCEDURE — OTHER PATIENT SPECIFIC COUNSELING: OTHER

## 2025-03-03 NOTE — PROCEDURE: PATIENT SPECIFIC COUNSELING
Discussed this appears consistent with a dermatofibroma. Reviewed removal would require surgical excision. Patient to C if lesion changes or grows.
Detail Level: Zone
Recommended patient purchase a toe spacer to reduce exacerbation from neighboring toe. Assured no concern for infection, rather inflammation and irritation. Assured no fungal toe nails at this time.

## 2025-05-12 ENCOUNTER — TRANSFERRED RECORDS (OUTPATIENT)
Dept: HEALTH INFORMATION MANAGEMENT | Facility: CLINIC | Age: 60
End: 2025-05-12
Payer: COMMERCIAL

## 2025-05-19 DIAGNOSIS — E78.5 HYPERLIPIDEMIA LDL GOAL <130: ICD-10-CM

## 2025-05-19 RX ORDER — ATORVASTATIN CALCIUM 20 MG/1
20 TABLET, FILM COATED ORAL DAILY
Qty: 90 TABLET | Refills: 0 | Status: SHIPPED | OUTPATIENT
Start: 2025-05-19

## 2025-05-19 NOTE — TELEPHONE ENCOUNTER
Will need to establish with new PCP in the fall. Renata nielson sent.  Mellisa Love PA-C on 5/19/2025 at 12:19 PM

## 2025-05-20 NOTE — TELEPHONE ENCOUNTER
Called to schedule establish  acre visit pt state was nit near her clendar will call back or go online to schedule appt     Randal Parikh CMA on 5/20/2025 at 10:48 AM

## 2025-06-09 ENCOUNTER — APPOINTMENT (OUTPATIENT)
Dept: URBAN - METROPOLITAN AREA CLINIC 256 | Age: 60
Setting detail: DERMATOLOGY
End: 2025-06-09

## 2025-06-09 DIAGNOSIS — L60.8 OTHER NAIL DISORDERS: ICD-10-CM

## 2025-06-09 PROBLEM — D23.61 OTHER BENIGN NEOPLASM OF SKIN OF RIGHT UPPER LIMB, INCLUDING SHOULDER: Status: ACTIVE | Noted: 2025-06-09

## 2025-06-09 PROCEDURE — OTHER PATIENT SPECIFIC COUNSELING: OTHER

## 2025-06-09 PROCEDURE — 99212 OFFICE O/P EST SF 10 MIN: CPT

## 2025-06-09 PROCEDURE — OTHER MIPS QUALITY: OTHER

## 2025-06-09 PROCEDURE — OTHER COUNSELING: OTHER

## 2025-06-09 PROCEDURE — OTHER PHOTO-DOCUMENTATION: OTHER

## 2025-06-09 ASSESSMENT — LOCATION DETAILED DESCRIPTION DERM: LOCATION DETAILED: RIGHT LATERAL GREAT TOE

## 2025-06-09 ASSESSMENT — LOCATION SIMPLE DESCRIPTION DERM: LOCATION SIMPLE: RIGHT GREAT TOE

## 2025-06-09 ASSESSMENT — LOCATION ZONE DERM: LOCATION ZONE: TOE

## 2025-07-28 ENCOUNTER — VIRTUAL VISIT (OUTPATIENT)
Dept: FAMILY MEDICINE | Facility: CLINIC | Age: 60
End: 2025-07-28
Payer: COMMERCIAL

## 2025-07-28 DIAGNOSIS — G43.009 MIGRAINE WITHOUT AURA AND WITHOUT STATUS MIGRAINOSUS, NOT INTRACTABLE: ICD-10-CM

## 2025-07-28 DIAGNOSIS — K59.09 OTHER CONSTIPATION: ICD-10-CM

## 2025-07-28 DIAGNOSIS — M81.8 OTHER OSTEOPOROSIS WITHOUT CURRENT PATHOLOGICAL FRACTURE: ICD-10-CM

## 2025-07-28 DIAGNOSIS — E78.5 HYPERLIPIDEMIA LDL GOAL <130: ICD-10-CM

## 2025-07-28 PROCEDURE — 98006 SYNCH AUDIO-VIDEO EST MOD 30: CPT | Performed by: PHYSICIAN ASSISTANT

## 2025-07-28 RX ORDER — POLYETHYLENE GLYCOL 3350 17 G/17G
17 POWDER, FOR SOLUTION ORAL DAILY
Qty: 510 G | Refills: 11 | Status: SHIPPED | OUTPATIENT
Start: 2025-07-28

## 2025-07-28 RX ORDER — RIZATRIPTAN BENZOATE 10 MG/1
10 TABLET ORAL
Qty: 9 TABLET | Refills: 1 | Status: SHIPPED | OUTPATIENT
Start: 2025-07-28

## 2025-07-28 RX ORDER — ALENDRONATE SODIUM 70 MG/1
70 TABLET ORAL
Qty: 12 TABLET | Refills: 1 | Status: SHIPPED | OUTPATIENT
Start: 2025-07-28

## 2025-07-28 RX ORDER — ATORVASTATIN CALCIUM 20 MG/1
20 TABLET, FILM COATED ORAL DAILY
Qty: 90 TABLET | Refills: 1 | Status: SHIPPED | OUTPATIENT
Start: 2025-07-28

## 2025-07-28 NOTE — PROGRESS NOTES
Ana Cristina is a 59 year old who is being evaluated via a billable video visit.    How would you like to obtain your AVS? MyChart  If the video visit is dropped, the invitation should be resent by: Text to cell phone: [phone]  Will anyone else be joining your video visit? No    Assessment & Plan     Other osteoporosis without current pathological fracture  On fosamax since 6/2024. Plan for repeat DXA in 1 year.   - alendronate (FOSAMAX) 70 MG tablet  Dispense: 12 tablet; Refill: 1    Hyperlipidemia LDL goal <130  Stable on atorvastatin. Due for repeat labs in the fall at annual.   - atorvastatin (LIPITOR) 20 MG tablet  Dispense: 90 tablet; Refill: 1    Migraine without aura and without status migrainosus, not intractable  Stable. Improving over the years. Refill maxalt to have on hand to use prn.  - rizatriptan (MAXALT) 10 MG tablet  Dispense: 9 tablet; Refill: 1    Other constipation  Chronic, stable. Uses miralax prn - refill provided. Plan to check TSH in the fall with annual labs - does have family history of thyroid issues.   - polyethylene glycol (MIRALAX) 17 GM/Dose powder  Dispense: 510 g; Refill: 11    Follow up in fall for annual exam and labs    The longitudinal plan of care for the diagnosis(es)/condition(s) as documented were addressed during this visit. Due to the added complexity in care, I will continue to support Ana Cristina in the subsequent management and with ongoing continuity of care.     Mellisa Love PA-C on 7/28/2025 at 6:57 AM        Subjective   Ana Cristina is a 59 year old, presenting for the following health issues:  Lipids, Recheck Medication, and Establish Care    History of Present Illness       Reason for visit:  Medication renewal   She is taking medications regularly.        Hyperlipidemia Follow-Up    Are you regularly taking any medication or supplement to lower your cholesterol?   Yes- atorvastatin  Are you having muscle aches or other side effects that you think could be caused  by your cholesterol lowering medication?  No    Hyperlipidemia  Continues on atorvastatin 20 mg daily    Osteoporosis   Continues on fosamax, started in 6/2024  Due for repeat DXA 6/2026    Migraines   Uses maxalt prn   Does not have to use often   Migraines have been getting better and better as years go on     Chronic constipation  Uses miralax prn  Calcium supplement seems to contribute to constipation   Hydration also a factor       Objective           Vitals:  No vitals were obtained today due to virtual visit.    Physical Exam   GENERAL: alert and no distress  EYES: Eyes grossly normal to inspection.  No discharge or erythema, or obvious scleral/conjunctival abnormalities.  RESP: No audible wheeze, cough, or visible cyanosis.    SKIN: Visible skin clear. No significant rash, abnormal pigmentation or lesions.  NEURO: Cranial nerves grossly intact.  Mentation and speech appropriate for age.  PSYCH: Appropriate affect, tone, and pace of words        Video-Visit Details    Type of service:  Video Visit   Originating Location (pt. Location): Home  Distant Location (provider location):  On-site  Platform used for Video Visit: Roque    Signed Electronically by: Mellisa Love PA-C